# Patient Record
Sex: MALE | Race: WHITE | HISPANIC OR LATINO | Employment: PART TIME | URBAN - METROPOLITAN AREA
[De-identification: names, ages, dates, MRNs, and addresses within clinical notes are randomized per-mention and may not be internally consistent; named-entity substitution may affect disease eponyms.]

---
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
## 2023-02-07 ENCOUNTER — TELEPHONE (OUTPATIENT)
Dept: HEMATOLOGY/ONCOLOGY | Facility: CLINIC | Age: 18
End: 2023-02-07
Payer: COMMERCIAL

## 2023-02-08 ENCOUNTER — HOSPITAL ENCOUNTER (OUTPATIENT)
Dept: RADIOLOGY | Facility: HOSPITAL | Age: 18
Discharge: HOME | End: 2023-02-08
Attending: INTERNAL MEDICINE
Payer: COMMERCIAL

## 2023-02-08 DIAGNOSIS — C81.18 NODULAR SCLEROSIS HODGKIN LYMPHOMA OF LYMPH NODES OF MULTIPLE REGIONS: ICD-10-CM

## 2023-02-08 PROCEDURE — 74177 CT ABD & PELVIS W/CONTRAST: CPT | Mod: TC

## 2023-02-08 PROCEDURE — 70491 CT SOFT TISSUE NECK W/DYE: CPT | Mod: TC

## 2023-02-08 PROCEDURE — 25500020 PHARM REV CODE 255: Performed by: INTERNAL MEDICINE

## 2023-02-08 PROCEDURE — 71260 CT THORAX DX C+: CPT | Mod: TC

## 2023-02-08 RX ADMIN — DIATRIZOATE MEGLUMINE AND DIATRIZOATE SODIUM 30 ML: 660; 100 LIQUID ORAL; RECTAL at 08:02

## 2023-02-08 RX ADMIN — IOPAMIDOL 120 ML: 755 INJECTION, SOLUTION INTRAVENOUS at 09:02

## 2023-02-10 PROBLEM — Z95.828 PORT-A-CATH IN PLACE: Status: RESOLVED | Noted: 2022-05-05 | Resolved: 2023-02-10

## 2023-02-13 NOTE — PROGRESS NOTES
Subjective:       Patient ID: Valeriy Nelson is a 17 y.o. male.  Pediatrician: Dr. Amena Lowery     Hodgkin Lymphoma Stage IIA--Diagnosed 21  Biopsy/pathology: Left neck excisional biopsy cervical LN done 21--Nodular sclerosis, classical Hodgkin Lymphoma, areas of clustering of malignant cells are identified suggestive of syncytial variant, CD15, PAX-5 and CD30: Positive in malignant cells, EMA: Focally positive in malignant cells, CD45: Negative in malignant cells, CD20 and CD3: Highlight the mixed B- and T lymphocyte population.  Imagin. CT soft tissue neck w/ and w/o contrast done at [facility] 21--extensive bulky left cervical lymphadenopathy concerning for lymphoma, multiple nodes throughout the carotid space, posterior cervical triangle and left supraclavicular region from level of mandibular angle to clavicle with internal foci of hypoenhancement suggesting cystic change, necrosis or suppurative lympadenitis, representative left supraclavicular lymph node measures [id number], biopsy recommended.  2. PET/CT done at [facility] 10/25/21--markedly FDG-avid left jugular chain, posterior triangle, and supraclavicular lymphadenopathy in the neck and FDG-avid superior mediastinal and left axillary zone 3 lymphadenopathy, reference lymph node anterior to SVC on right measures 2.9X1.1cm, SUV 10.4, right paratracheal node measures 2.0X1.4cm with SUV 7.8, left paratracheal node measures 1.9X0.9cm and periaortic prevascular lymph node measures 1.4X0.6cm with SUV 4.5, Deauville score 5, no masses or adenopathy in AP, no bone lesions.  3. PET/CT done at [facility] 21--significant interval response to treatment, no residual FDG uptake w/n the nodes which are significantly reduced in size in neck and chest, representative left supraclavicular LN previously measured 1.6X1.4cm now measures 1X0.4cm, previous SUV 9.8, now there is no uptake, Deauville score 1, significant areas of  uptake appearing bilaterally symmetric in neck, chest, upper abdomen w/o corresponding adenopathy, likely repeat to prominent brown fat, normal variant.  4. PET/CT done at [facility] 2/9/22--Stable exam without evidence of new or progressive hypermetabolic lymphadenopathy compared to 10/13/2021.  5. CT neck/C/A/P done 8/8/22--no enlarged LNs in neck, chest, abdomen or pelvis.   6. CT neck/C/A/P done 2/8/23--no enlarged Lns in neck, chest, abdomen or pelvis.      Right IJ mediport placed by Dr. Lopez 10/13/21--removed 2/10/23.     Treatment history:   ABVD X 4 cycles completed 10/26/21--2/4/22.     Current treatment plan: Observation     Chief Complaint: Follow-up (2m f/u, Hodgkins Lymphoma. No issues or problems.)    HPI   Patient presents for follow-up of Hodgkin Lymphoma. He is doing well. No complaints. Recent labs and scans good and I went over results. He had his mediport out on Friday. 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 (Patient not taking: Reported on 2/15/2023) 8 tablet 0     No current facility-administered medications on file prior to visit.      Review of Systems   Constitutional:  Negative for appetite change, fatigue, fever and unexpected weight change.   HENT:  Negative for mouth sores.    Eyes: Negative.  Negative for visual disturbance.   Respiratory:  Negative for cough and shortness of breath.    Cardiovascular:  Negative for chest pain and leg swelling.   Gastrointestinal:  Negative for abdominal distention, abdominal pain, constipation,  diarrhea, nausea, vomiting and reflux.   Genitourinary:  Negative for difficulty urinating, dysuria, frequency and hematuria.   Musculoskeletal:  Negative for arthralgias and back pain.   Integumentary:  Negative for rash.   Neurological:  Negative for weakness and headaches.   Hematological:  Negative for adenopathy.   Psychiatric/Behavioral:  Negative for sleep disturbance. The patient is not nervous/anxious.        Vitals:    02/15/23 0946   BP: 136/80   Pulse: 61   Temp: 98.1 °F (36.7 °C)         Physical Exam  Constitutional:       General: He is awake.      Appearance: Normal appearance.   HENT:      Head: Normocephalic and atraumatic.      Nose: Nose normal.      Mouth/Throat:      Mouth: Mucous membranes are moist.   Eyes:      General: Vision grossly intact.      Extraocular Movements: Extraocular movements intact.      Conjunctiva/sclera: Conjunctivae normal.   Neck:      Comments: Scar left neck from previous lymph node biopsy, no adenopathy  Cardiovascular:      Rate and Rhythm: Normal rate and regular rhythm.      Heart sounds: Normal heart sounds.   Pulmonary:      Effort: Pulmonary effort is normal.      Breath sounds: Normal breath sounds.   Chest:      Comments: Scar from previous mediport with steri strips in place  Abdominal:      General: Bowel sounds are normal. There is no distension.      Palpations: Abdomen is soft.      Tenderness: There is no abdominal tenderness.   Musculoskeletal:      Cervical back: Normal range of motion and neck supple.      Right lower leg: No edema.      Left lower leg: No edema.   Lymphadenopathy:      Cervical: No cervical adenopathy.      Upper Body:      Right upper body: No supraclavicular adenopathy.      Left upper body: No supraclavicular adenopathy.   Skin:     General: Skin is warm.   Neurological:      Mental Status: He is alert and oriented to person, place, and time.      Motor: Motor function is intact.   Psychiatric:         Mood and Affect: Mood  normal.         Speech: Speech normal.         Behavior: Behavior is cooperative.         Judgment: Judgment normal.       Admission on 02/10/2023, Discharged on 02/10/2023   Component Date Value    Pathology Result 02/10/2023     Lab Visit on 02/08/2023   Component Date Value    Sodium Level 02/08/2023 135 (L)     Potassium Level 02/08/2023 5.5 (H)     Chloride 02/08/2023 100     Carbon Dioxide 02/08/2023 28     Glucose Level 02/08/2023 93     Blood Urea Nitrogen 02/08/2023 15.7     Creatinine 02/08/2023 0.91     Calcium Level Total 02/08/2023 10.6 (H)     Protein Total 02/08/2023 7.2     Albumin Level 02/08/2023 4.7     Globulin 02/08/2023 2.5     Albumin/Globulin Ratio 02/08/2023 1.9     Bilirubin Total 02/08/2023 0.6     Alkaline Phosphatase 02/08/2023 103     Alanine Aminotransferase 02/08/2023 24     Aspartate Aminotransfera* 02/08/2023 16     eGFR 02/08/2023 >60     Lactate Dehydrogenase 02/08/2023 151     Sed Rate 02/08/2023 2     WBC 02/08/2023 5.4     RBC 02/08/2023 5.43     Hgb 02/08/2023 16.0     Hct 02/08/2023 47.3     MCV 02/08/2023 87.1     MCH 02/08/2023 29.5     MCHC 02/08/2023 33.8     RDW 02/08/2023 11.4 (L)     Platelet 02/08/2023 297     MPV 02/08/2023 8.8     Neut % 02/08/2023 61.6     Lymph % 02/08/2023 26.8     Mono % 02/08/2023 7.4     Eos % 02/08/2023 3.4     Basophil % 02/08/2023 0.6     Lymph # 02/08/2023 1.44     Neut # 02/08/2023 3.31     Mono # 02/08/2023 0.40     Eos # 02/08/2023 0.18     Baso # 02/08/2023 0.03     IG# 02/08/2023 0.01     IG% 02/08/2023 0.2         Assessment:       1. 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  Patient has no B-symptoms. LDH normal, ESR 19 (<50). No anemia or leukocytosis.  PET done 10/25/21 shows LN involvement left neck, axilla and mediastinum.   Patient appears to have favorable risk disease  though he does have involvement of 3 LN regions.   Treatment recommended per NCCN guidelines with ABVD. Dr. Mao recommended against radiation due to his young age.     2DECHO done 10/22/21 EF 60-65%, full PFTs normal.  Cycle 1 ABVD started on 10/26/21.   PET/CT done 12/8/21 after 2 cycles showed CR with Deauville 1 and resolution of all adenopathy.  Completed Cycle 4 on 2/4/22.  PET/CT done 2/9/22 c/w ongoing CR/RUBI.   CT soft tissue neck/C/A/P done 8/8/22 with RUBI.  CT soft tissue neck/C/A/P done 2/8/23 with RUBI.     Currently patient is doing well without any signs or symptoms to suggest disease recurrence.       Recent labs from 2/8/23 good, aside from elevated potassium and calcium, likely lab error since these have been normal prior. LDH and sed rate both WNL    Will continue routine surveillance visits.  RTC 3 months for follow-up with labs and visit.  Plan to repeat CT soft tissue neck/C/A/P in 6 months.       Plan for continued surveillance visits every 3 months with labs and LDH, ESR as well as CT neck/C/A/P w/ contrast every 6 months X 2 years.  All questions answered at this time.         Susy Mao MD

## 2023-02-15 ENCOUNTER — TELEPHONE (OUTPATIENT)
Dept: HEMATOLOGY/ONCOLOGY | Facility: CLINIC | Age: 18
End: 2023-02-15

## 2023-02-15 ENCOUNTER — OFFICE VISIT (OUTPATIENT)
Dept: HEMATOLOGY/ONCOLOGY | Facility: CLINIC | Age: 18
End: 2023-02-15
Payer: COMMERCIAL

## 2023-02-15 VITALS
SYSTOLIC BLOOD PRESSURE: 136 MMHG | DIASTOLIC BLOOD PRESSURE: 80 MMHG | HEIGHT: 74 IN | WEIGHT: 202 LBS | HEART RATE: 61 BPM | BODY MASS INDEX: 25.93 KG/M2 | TEMPERATURE: 98 F

## 2023-02-15 DIAGNOSIS — C81.18 NODULAR SCLEROSIS HODGKIN LYMPHOMA OF LYMPH NODES OF MULTIPLE REGIONS: Primary | ICD-10-CM

## 2023-02-15 PROCEDURE — 1160F PR REVIEW ALL MEDS BY PRESCRIBER/CLIN PHARMACIST DOCUMENTED: ICD-10-PCS | Mod: CPTII,S$GLB,, | Performed by: INTERNAL MEDICINE

## 2023-02-15 PROCEDURE — 1159F PR MEDICATION LIST DOCUMENTED IN MEDICAL RECORD: ICD-10-PCS | Mod: CPTII,S$GLB,, | Performed by: INTERNAL MEDICINE

## 2023-02-15 PROCEDURE — 3079F DIAST BP 80-89 MM HG: CPT | Mod: CPTII,S$GLB,, | Performed by: INTERNAL MEDICINE

## 2023-02-15 PROCEDURE — 3075F PR MOST RECENT SYSTOLIC BLOOD PRESS GE 130-139MM HG: ICD-10-PCS | Mod: CPTII,S$GLB,, | Performed by: INTERNAL MEDICINE

## 2023-02-15 PROCEDURE — 3075F SYST BP GE 130 - 139MM HG: CPT | Mod: CPTII,S$GLB,, | Performed by: INTERNAL MEDICINE

## 2023-02-15 PROCEDURE — 99999 PR PBB SHADOW E&M-EST. PATIENT-LVL III: CPT | Mod: PBBFAC,,, | Performed by: INTERNAL MEDICINE

## 2023-02-15 PROCEDURE — 3079F PR MOST RECENT DIASTOLIC BLOOD PRESSURE 80-89 MM HG: ICD-10-PCS | Mod: CPTII,S$GLB,, | Performed by: INTERNAL MEDICINE

## 2023-02-15 PROCEDURE — 99999 PR PBB SHADOW E&M-EST. PATIENT-LVL III: ICD-10-PCS | Mod: PBBFAC,,, | Performed by: INTERNAL MEDICINE

## 2023-02-15 PROCEDURE — 3008F BODY MASS INDEX DOCD: CPT | Mod: CPTII,S$GLB,, | Performed by: INTERNAL MEDICINE

## 2023-02-15 PROCEDURE — 99214 OFFICE O/P EST MOD 30 MIN: CPT | Mod: S$GLB,,, | Performed by: INTERNAL MEDICINE

## 2023-02-15 PROCEDURE — 99214 PR OFFICE/OUTPT VISIT, EST, LEVL IV, 30-39 MIN: ICD-10-PCS | Mod: S$GLB,,, | Performed by: INTERNAL MEDICINE

## 2023-02-15 PROCEDURE — 3008F PR BODY MASS INDEX (BMI) DOCUMENTED: ICD-10-PCS | Mod: CPTII,S$GLB,, | Performed by: INTERNAL MEDICINE

## 2023-02-15 PROCEDURE — 1159F MED LIST DOCD IN RCRD: CPT | Mod: CPTII,S$GLB,, | Performed by: INTERNAL MEDICINE

## 2023-02-15 PROCEDURE — 1160F RVW MEDS BY RX/DR IN RCRD: CPT | Mod: CPTII,S$GLB,, | Performed by: INTERNAL MEDICINE

## 2023-04-24 NOTE — PROGRESS NOTES
Subjective:       Patient ID: Valeriy Nelson is a 17 y.o. male.  Pediatrician: Dr. Amena Lowery     Hodgkin Lymphoma Stage IIA--Diagnosed 21  Biopsy/pathology: Left neck excisional biopsy cervical LN done 21--Nodular sclerosis, classical Hodgkin Lymphoma, areas of clustering of malignant cells are identified suggestive of syncytial variant, CD15, PAX-5 and CD30: Positive in malignant cells, EMA: Focally positive in malignant cells, CD45: Negative in malignant cells, CD20 and CD3: Highlight the mixed B- and T lymphocyte population.  Imagin. CT soft tissue neck w/ and w/o contrast done at [facility] 21--extensive bulky left cervical lymphadenopathy concerning for lymphoma, multiple nodes throughout the carotid space, posterior cervical triangle and left supraclavicular region from level of mandibular angle to clavicle with internal foci of hypoenhancement suggesting cystic change, necrosis or suppurative lympadenitis, representative left supraclavicular lymph node measures [id number], biopsy recommended.  2. PET/CT done at [facility] 10/25/21--markedly FDG-avid left jugular chain, posterior triangle, and supraclavicular lymphadenopathy in the neck and FDG-avid superior mediastinal and left axillary zone 3 lymphadenopathy, reference lymph node anterior to SVC on right measures 2.9X1.1cm, SUV 10.4, right paratracheal node measures 2.0X1.4cm with SUV 7.8, left paratracheal node measures 1.9X0.9cm and periaortic prevascular lymph node measures 1.4X0.6cm with SUV 4.5, Deauville score 5, no masses or adenopathy in AP, no bone lesions.  3. PET/CT done at [facility] 21--significant interval response to treatment, no residual FDG uptake w/n the nodes which are significantly reduced in size in neck and chest, representative left supraclavicular LN previously measured 1.6X1.4cm now measures 1X0.4cm, previous SUV 9.8, now there is no uptake, Deauville score 1, significant areas of  uptake appearing bilaterally symmetric in neck, chest, upper abdomen w/o corresponding adenopathy, likely repeat to prominent brown fat, normal variant.  4. PET/CT done at [facility] 2/9/22--Stable exam without evidence of new or progressive hypermetabolic lymphadenopathy compared to 10/13/2021.  5. CT neck/C/A/P done 8/8/22--no enlarged LNs in neck, chest, abdomen or pelvis.   6. CT neck/C/A/P done 2/8/23--no enlarged Lns in neck, chest, abdomen or pelvis.      Right IJ mediport placed by Dr. Lopez 10/13/21--removed 2/10/23.     Treatment history:   ABVD X 4 cycles completed 10/26/21--2/4/22.     Current treatment plan: Observation     Chief Complaint: Other Misc (Pt reports no new concerns today.)    HPI   Patient presents for telemedicine follow-up of Hodgkin Lymphoma. He is doing well. No complaints. Recent labs good and I went over results.  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 Denies any B symptoms. He plans to return as a  for the summer camp in [place]. No new problems reported.     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 (Patient not taking: Reported on 2/15/2023) 8 tablet 0     No current facility-administered medications on file prior to visit.      Review of Systems   Constitutional:  Negative for appetite change, fatigue, fever and unexpected weight change.   HENT:  Negative for mouth sores.    Eyes: Negative.  Negative for visual disturbance.   Respiratory:  Negative for cough and shortness of breath.    Cardiovascular:  Negative for chest pain and leg  swelling.   Gastrointestinal:  Negative for abdominal distention, abdominal pain, constipation, diarrhea, nausea, vomiting and reflux.   Genitourinary:  Negative for difficulty urinating, dysuria, frequency and hematuria.   Musculoskeletal:  Negative for arthralgias and back pain.   Integumentary:  Negative for rash.   Neurological:  Negative for weakness and headaches.   Hematological:  Negative for adenopathy.   Psychiatric/Behavioral:  Negative for sleep disturbance. The patient is not nervous/anxious.        There were no vitals filed for this visit.    Physical Exam  Vitals reviewed.   Constitutional:       Appearance: Normal appearance.   HENT:      Head: Normocephalic.   Eyes:      Extraocular Movements: Extraocular movements intact.   Pulmonary:      Effort: Pulmonary effort is normal.   Musculoskeletal:      Cervical back: Neck supple.   Neurological:      Mental Status: He is alert and oriented to person, place, and time.   Psychiatric:         Mood and Affect: Mood normal.         Thought Content: Thought content normal.         Judgment: Judgment normal.       Lab Visit on 04/26/2023   Component Date Value    Sodium Level 04/26/2023 140     Potassium Level 04/26/2023 4.9     Chloride 04/26/2023 104     Carbon Dioxide 04/26/2023 28     Glucose Level 04/26/2023 88     Blood Urea Nitrogen 04/26/2023 18.3     Creatinine 04/26/2023 1.00     Calcium Level Total 04/26/2023 10.0     Protein Total 04/26/2023 7.3     Albumin Level 04/26/2023 4.8     Globulin 04/26/2023 2.5     Albumin/Globulin Ratio 04/26/2023 1.9     Bilirubin Total 04/26/2023 0.4     Alkaline Phosphatase 04/26/2023 103     Alanine Aminotransferase 04/26/2023 13     Aspartate Aminotransfera* 04/26/2023 16     eGFR 04/26/2023 >60     Lactate Dehydrogenase 04/26/2023 168     Sed Rate 04/26/2023 1     WBC 04/26/2023 7.4     RBC 04/26/2023 5.06     Hgb 04/26/2023 15.0     Hct 04/26/2023 45.1     MCV 04/26/2023 89.1     MCH 04/26/2023 29.6     MCHC  04/26/2023 33.3     RDW 04/26/2023 11.7     Platelet 04/26/2023 294     MPV 04/26/2023 9.0     Neut % 04/26/2023 57.5     Lymph % 04/26/2023 27.8     Mono % 04/26/2023 6.8     Eos % 04/26/2023 7.3     Basophil % 04/26/2023 0.5     Lymph # 04/26/2023 2.05     Neut # 04/26/2023 4.24     Mono # 04/26/2023 0.50     Eos # 04/26/2023 0.54     Baso # 04/26/2023 0.04     IG# 04/26/2023 0.01     IG% 04/26/2023 0.1         Assessment:       1. 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  Patient has no B-symptoms. LDH normal, ESR 19 (<50). No anemia or leukocytosis.  PET done 10/25/21 shows LN involvement left neck, axilla and mediastinum.   Patient appears to have favorable risk disease though he does have involvement of 3 LN regions.   Treatment recommended per NCCN guidelines with ABVD. Dr. Mao recommended against radiation due to his young age.     2DECHO done 10/22/21 EF 60-65%, full PFTs normal.  Cycle 1 ABVD started on 10/26/21.   PET/CT done 12/8/21 after 2 cycles showed CR with Deauville 1 and resolution of all adenopathy.  Completed Cycle 4 on 2/4/22.  PET/CT done 2/9/22 c/w ongoing CR/RUBI.   CT soft tissue neck/C/A/P done 8/8/22 with RUBI.  CT soft tissue neck/C/A/P done 2/8/23 with RUBI.     Currently patient is doing well without any signs or symptoms to suggest disease recurrence.   Recent labs, LDH and sed rate both WNL  Will continue routine surveillance visits.  Plan to repeat CT soft tissue neck/C/A/P in 3 months.    Follow-up in 3 months after scans completed.      Plan for continued surveillance visits every 3 months with labs and LDH, ESR as well as CT neck/C/A/P w/ contrast every 6 months X 2 years.  All questions answered at this time.      This is a telemedicine note. Patient was treated using telemedicine, realtime audio and video, according to  [facility] protocol. I, distant provider, conducted the visit from [facility]. The patient participated in the visit at a non-[facility] location selected by the patient, identified at his home. I am licensed in the state where the patient stated he was located. The patient stated that he understood and accepted the privacy and security risks to their information at their location.        Caitlin Modi, [facility]

## 2023-04-26 ENCOUNTER — LAB VISIT (OUTPATIENT)
Dept: LAB | Facility: HOSPITAL | Age: 18
End: 2023-04-26
Attending: NURSE PRACTITIONER
Payer: COMMERCIAL

## 2023-04-26 DIAGNOSIS — C81.18 NODULAR SCLEROSIS HODGKIN LYMPHOMA OF LYMPH NODES OF MULTIPLE REGIONS: ICD-10-CM

## 2023-04-26 LAB
ALBUMIN SERPL-MCNC: 4.8 G/DL (ref 3.5–5)
ALBUMIN/GLOB SERPL: 1.9 RATIO (ref 1.1–2)
ALP SERPL-CCNC: 103 UNIT/L
ALT SERPL-CCNC: 13 UNIT/L (ref 0–55)
AST SERPL-CCNC: 16 UNIT/L (ref 5–34)
BILIRUBIN DIRECT+TOT PNL SERPL-MCNC: 0.4 MG/DL
BUN SERPL-MCNC: 18.3 MG/DL (ref 8.9–20.6)
CALCIUM SERPL-MCNC: 10 MG/DL (ref 8.4–10.2)
CHLORIDE SERPL-SCNC: 104 MMOL/L (ref 98–107)
CO2 SERPL-SCNC: 28 MMOL/L (ref 22–29)
CREAT SERPL-MCNC: 1 MG/DL (ref 0.73–1.18)
ERYTHROCYTE [SEDIMENTATION RATE] IN BLOOD: 1 MM/HR (ref 0–15)
GFR SERPLBLD CREATININE-BSD FMLA CKD-EPI: >60 MLS/MIN/1.73/M2
GLOBULIN SER-MCNC: 2.5 GM/DL (ref 2.4–3.5)
GLUCOSE SERPL-MCNC: 88 MG/DL (ref 74–100)
LDH SERPL-CCNC: 168 U/L (ref 125–220)
POTASSIUM SERPL-SCNC: 4.9 MMOL/L (ref 3.5–5.1)
PROT SERPL-MCNC: 7.3 GM/DL (ref 6.4–8.3)
SODIUM SERPL-SCNC: 140 MMOL/L (ref 136–145)

## 2023-04-26 PROCEDURE — 85651 RBC SED RATE NONAUTOMATED: CPT

## 2023-04-26 PROCEDURE — 85025 COMPLETE CBC W/AUTO DIFF WBC: CPT

## 2023-04-26 PROCEDURE — 83615 LACTATE (LD) (LDH) ENZYME: CPT

## 2023-04-26 PROCEDURE — 36415 COLL VENOUS BLD VENIPUNCTURE: CPT

## 2023-04-26 PROCEDURE — 80053 COMPREHEN METABOLIC PANEL: CPT

## 2023-04-27 LAB
BASOPHILS # BLD AUTO: 0.04 X10(3)/MCL (ref 0–0.2)
BASOPHILS NFR BLD AUTO: 0.5 %
EOSINOPHIL # BLD AUTO: 0.54 X10(3)/MCL (ref 0–0.9)
EOSINOPHIL NFR BLD AUTO: 7.3 %
ERYTHROCYTE [DISTWIDTH] IN BLOOD BY AUTOMATED COUNT: 11.7 % (ref 11.5–17)
HCT VFR BLD AUTO: 45.1 % (ref 42–52)
HGB BLD-MCNC: 15 G/DL (ref 14–18)
IMM GRANULOCYTES # BLD AUTO: 0.01 X10(3)/MCL (ref 0–0.04)
IMM GRANULOCYTES NFR BLD AUTO: 0.1 %
LYMPHOCYTES # BLD AUTO: 2.05 X10(3)/MCL (ref 0.6–4.6)
LYMPHOCYTES NFR BLD AUTO: 27.8 %
MCH RBC QN AUTO: 29.6 PG (ref 27–31)
MCHC RBC AUTO-ENTMCNC: 33.3 G/DL (ref 33–36)
MCV RBC AUTO: 89.1 FL (ref 80–94)
MONOCYTES # BLD AUTO: 0.5 X10(3)/MCL (ref 0.1–1.3)
MONOCYTES NFR BLD AUTO: 6.8 %
NEUTROPHILS # BLD AUTO: 4.24 X10(3)/MCL (ref 2.1–9.2)
NEUTROPHILS NFR BLD AUTO: 57.5 %
PLATELET # BLD AUTO: 294 X10(3)/MCL (ref 130–400)
PMV BLD AUTO: 9 FL (ref 7.4–10.4)
RBC # BLD AUTO: 5.06 X10(6)/MCL (ref 4.7–6.1)
WBC # SPEC AUTO: 7.4 X10(3)/MCL (ref 4.5–11.5)

## 2023-05-01 ENCOUNTER — OFFICE VISIT (OUTPATIENT)
Dept: HEMATOLOGY/ONCOLOGY | Facility: CLINIC | Age: 18
End: 2023-05-01
Payer: COMMERCIAL

## 2023-05-01 VITALS — BODY MASS INDEX: 26.18 KG/M2 | WEIGHT: 204 LBS | HEIGHT: 74 IN

## 2023-05-01 DIAGNOSIS — C81.18 NODULAR SCLEROSIS HODGKIN LYMPHOMA OF LYMPH NODES OF MULTIPLE REGIONS: Primary | ICD-10-CM

## 2023-05-01 DIAGNOSIS — R59.0 LOCALIZED ENLARGED LYMPH NODES: ICD-10-CM

## 2023-05-01 PROCEDURE — 1159F MED LIST DOCD IN RCRD: CPT | Mod: CPTII,95,, | Performed by: NURSE PRACTITIONER

## 2023-05-01 PROCEDURE — 3008F PR BODY MASS INDEX (BMI) DOCUMENTED: ICD-10-PCS | Mod: CPTII,95,, | Performed by: NURSE PRACTITIONER

## 2023-05-01 PROCEDURE — 1160F RVW MEDS BY RX/DR IN RCRD: CPT | Mod: CPTII,95,, | Performed by: NURSE PRACTITIONER

## 2023-05-01 PROCEDURE — 99212 OFFICE O/P EST SF 10 MIN: CPT | Mod: 95,,, | Performed by: NURSE PRACTITIONER

## 2023-05-01 PROCEDURE — 1160F PR REVIEW ALL MEDS BY PRESCRIBER/CLIN PHARMACIST DOCUMENTED: ICD-10-PCS | Mod: CPTII,95,, | Performed by: NURSE PRACTITIONER

## 2023-05-01 PROCEDURE — 3008F BODY MASS INDEX DOCD: CPT | Mod: CPTII,95,, | Performed by: NURSE PRACTITIONER

## 2023-05-01 PROCEDURE — 1159F PR MEDICATION LIST DOCUMENTED IN MEDICAL RECORD: ICD-10-PCS | Mod: CPTII,95,, | Performed by: NURSE PRACTITIONER

## 2023-05-01 PROCEDURE — 99212 PR OFFICE/OUTPT VISIT, EST, LEVL II, 10-19 MIN: ICD-10-PCS | Mod: 95,,, | Performed by: NURSE PRACTITIONER

## 2023-08-11 ENCOUNTER — HOSPITAL ENCOUNTER (OUTPATIENT)
Dept: RADIOLOGY | Facility: HOSPITAL | Age: 18
Discharge: HOME | End: 2023-08-11
Attending: NURSE PRACTITIONER
Payer: COMMERCIAL

## 2023-08-11 DIAGNOSIS — C81.18 NODULAR SCLEROSIS HODGKIN LYMPHOMA OF LYMPH NODES OF MULTIPLE REGIONS: ICD-10-CM

## 2023-08-11 DIAGNOSIS — R59.0 LOCALIZED ENLARGED LYMPH NODES: ICD-10-CM

## 2023-08-11 PROCEDURE — 74177 CT ABD & PELVIS W/CONTRAST: CPT | Mod: TC

## 2023-08-11 PROCEDURE — 25500020 PHARM REV CODE 255: Performed by: NURSE PRACTITIONER

## 2023-08-11 PROCEDURE — 70492 CT SFT TSUE NCK W/O & W/DYE: CPT | Mod: TC

## 2023-08-11 PROCEDURE — 71260 CT THORAX DX C+: CPT | Mod: TC

## 2023-08-11 RX ADMIN — DIATRIZOATE MEGLUMINE AND DIATRIZOATE SODIUM 30 ML: 660; 100 LIQUID ORAL; RECTAL at 12:08

## 2023-08-11 RX ADMIN — IOPAMIDOL 125 ML: 755 INJECTION, SOLUTION INTRAVENOUS at 01:08

## 2023-08-14 NOTE — PROGRESS NOTES
Subjective:       Patient ID: Valeriy Nelson is a 18 y.o. male.  Pediatrician: Dr. Amena Lowery     Hodgkin Lymphoma Stage IIA--Diagnosed 21  Biopsy/pathology: Left neck excisional biopsy cervical LN done 21--Nodular sclerosis, classical Hodgkin Lymphoma, areas of clustering of malignant cells are identified suggestive of syncytial variant, CD15, PAX-5 and CD30: Positive in malignant cells, EMA: Focally positive in malignant cells, CD45: Negative in malignant cells, CD20 and CD3: Highlight the mixed B- and T lymphocyte population.  Imagin. CT soft tissue neck w/ and w/o contrast done at [facility] 21--extensive bulky left cervical lymphadenopathy concerning for lymphoma, multiple nodes throughout the carotid space, posterior cervical triangle and left supraclavicular region from level of mandibular angle to clavicle with internal foci of hypoenhancement suggesting cystic change, necrosis or suppurative lympadenitis, representative left supraclavicular lymph node measures [id number], biopsy recommended.  2. PET/CT done at [facility] 10/25/21--markedly FDG-avid left jugular chain, posterior triangle, and supraclavicular lymphadenopathy in the neck and FDG-avid superior mediastinal and left axillary zone 3 lymphadenopathy, reference lymph node anterior to SVC on right measures 2.9X1.1cm, SUV 10.4, right paratracheal node measures 2.0X1.4cm with SUV 7.8, left paratracheal node measures 1.9X0.9cm and periaortic prevascular lymph node measures 1.4X0.6cm with SUV 4.5, Deauville score 5, no masses or adenopathy in AP, no bone lesions.  3. PET/CT done at [facility] 21--significant interval response to treatment, no residual FDG uptake w/n the nodes which are significantly reduced in size in neck and chest, representative left supraclavicular LN previously measured 1.6X1.4cm now measures 1X0.4cm, previous SUV 9.8, now there is no uptake, Deauville score 1, significant areas of  uptake appearing bilaterally symmetric in neck, chest, upper abdomen w/o corresponding adenopathy, likely repeat to prominent brown fat, normal variant.  4. PET/CT done at [facility] 2/9/22--Stable exam without evidence of new or progressive hypermetabolic lymphadenopathy compared to 10/13/2021.  5. CT neck/C/A/P done 8/8/22--no enlarged LNs in neck, chest, abdomen or pelvis.   6. CT neck/C/A/P done 2/8/23--no enlarged Lns in neck, chest, abdomen or pelvis.   7. CT neck/C/A/P done 8/11/23--no enlarged Lns in neck, chest, abdomen or pelvis.     Right IJ mediport placed by Dr. Lopez 10/13/21--removed 2/10/23.     Treatment history:   ABVD X 4 cycles completed 10/26/21--2/4/22.     Current treatment plan: Observation     Chief Complaint: Other Misc (Pt reports no new concerns today.)    HPI   Patient presents for follow-up of Hodgkin Lymphoma. He is doing well. About to start back school next week. Recent labs all good and CT scans good. He has no B-symptoms.     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     No current facility-administered medications on file prior to visit.      Review of Systems   Constitutional:  Negative for appetite change, fatigue, fever and unexpected weight change.   HENT:  Negative for mouth sores.    Eyes: Negative.  Negative for visual disturbance.   Respiratory:  Negative for cough and shortness of breath.    Cardiovascular:  Negative for chest pain and leg swelling.   Gastrointestinal:  Negative for abdominal distention, abdominal pain, constipation, diarrhea, nausea, vomiting and reflux.   Genitourinary:  Negative for difficulty urinating, dysuria, frequency and hematuria.   Musculoskeletal:  Negative for arthralgias and back pain.    Integumentary:  Negative for rash.   Neurological:  Negative for weakness and headaches.   Hematological:  Negative for adenopathy.   Psychiatric/Behavioral:  Negative for sleep disturbance. The patient is not nervous/anxious.          Vitals:    08/16/23 1017   BP: (!) 143/85   Pulse: 79   Resp: 14   Temp: 98 °F (36.7 °C)       Physical Exam  Vitals reviewed.   Constitutional:       Appearance: Normal appearance.   HENT:      Head: Normocephalic.   Eyes:      Extraocular Movements: Extraocular movements intact.   Pulmonary:      Effort: Pulmonary effort is normal.   Musculoskeletal:      Cervical back: Neck supple.   Neurological:      Mental Status: He is alert and oriented to person, place, and time.   Psychiatric:         Mood and Affect: Mood normal.         Thought Content: Thought content normal.         Judgment: Judgment normal.         Lab Visit on 08/11/2023   Component Date Value    Lactate Dehydrogenase 08/11/2023 109 (L)     Sodium Level 08/11/2023 136     Potassium Level 08/11/2023 4.9     Chloride 08/11/2023 101     Carbon Dioxide 08/11/2023 28     Glucose Level 08/11/2023 90     Blood Urea Nitrogen 08/11/2023 13.2     Creatinine 08/11/2023 0.83     Calcium Level Total 08/11/2023 9.9     Protein Total 08/11/2023 6.7     Albumin Level 08/11/2023 4.5     Globulin 08/11/2023 2.2 (L)     Albumin/Globulin Ratio 08/11/2023 2.0     Bilirubin Total 08/11/2023 0.8     Alkaline Phosphatase 08/11/2023 89     Alanine Aminotransferase 08/11/2023 13     Aspartate Aminotransfera* 08/11/2023 14     eGFR 08/11/2023 >60     Sed Rate 08/11/2023 <1     WBC 08/11/2023 7.01     RBC 08/11/2023 5.12     Hgb 08/11/2023 15.1     Hct 08/11/2023 45.9     MCV 08/11/2023 89.6     MCH 08/11/2023 29.5     MCHC 08/11/2023 32.9 (L)     RDW 08/11/2023 11.6     Platelet 08/11/2023 283     MPV 08/11/2023 9.0     Neut % 08/11/2023 66.4     Lymph % 08/11/2023 24.5     Mono % 08/11/2023 6.1     Eos % 08/11/2023 2.3     Basophil %  08/11/2023 0.6     Lymph # 08/11/2023 1.72     Neut # 08/11/2023 4.65     Mono # 08/11/2023 0.43     Eos # 08/11/2023 0.16     Baso # 08/11/2023 0.04     IG# 08/11/2023 0.01     IG% 08/11/2023 0.1         Assessment:       1. 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  Patient has no B-symptoms. LDH normal, ESR 19 (<50). No anemia or leukocytosis.  PET done 10/25/21 shows LN involvement left neck, axilla and mediastinum.   Patient appears to have favorable risk disease though he does have involvement of 3 LN regions.   Treatment recommended per NCCN guidelines with ABVD. Dr. Mao recommended against radiation due to his young age.     2DECHO done 10/22/21 EF 60-65%, full PFTs normal.  Cycle 1 ABVD started on 10/26/21.   PET/CT done 12/8/21 after 2 cycles showed CR with Deauville 1 and resolution of all adenopathy.  Completed Cycle 4 on 2/4/22.  PET/CT done 2/9/22 c/w ongoing CR/RUBI.   CT soft tissue neck/C/A/P done 8/8/22 with RUBI.  CT soft tissue neck/C/A/P done 2/8/23 with RUBI.   CT soft tissue neck/C/A/P done 8/11/23 with RUBI.     Currently patient is doing well without any signs or symptoms to suggest disease recurrence.     Recent labs, LDH and sed rate both WNL  Will continue routine surveillance visits.  RTC 3 months for follow-up with repeat labs.   Plan to repeat CT soft tissue neck/C/A/P in 6 months and this will be final scan. Visits with labs will continue thereafter every 6 months.     Plan for continued surveillance visits every 3 months with labs and LDH, ESR as well as CT neck/C/A/P w/ contrast every 6 months X 2 years.  All questions answered at this time.      Susy Mao MD

## 2023-08-16 ENCOUNTER — OFFICE VISIT (OUTPATIENT)
Dept: HEMATOLOGY/ONCOLOGY | Facility: CLINIC | Age: 18
End: 2023-08-16
Payer: COMMERCIAL

## 2023-08-16 VITALS
DIASTOLIC BLOOD PRESSURE: 85 MMHG | WEIGHT: 201.19 LBS | HEIGHT: 74 IN | HEART RATE: 79 BPM | RESPIRATION RATE: 14 BRPM | SYSTOLIC BLOOD PRESSURE: 143 MMHG | OXYGEN SATURATION: 97 % | BODY MASS INDEX: 25.82 KG/M2 | TEMPERATURE: 98 F

## 2023-08-16 DIAGNOSIS — C81.18 NODULAR SCLEROSIS HODGKIN LYMPHOMA OF LYMPH NODES OF MULTIPLE REGIONS: Primary | ICD-10-CM

## 2023-08-16 PROCEDURE — 3008F BODY MASS INDEX DOCD: CPT | Mod: CPTII,S$GLB,, | Performed by: INTERNAL MEDICINE

## 2023-08-16 PROCEDURE — 99214 OFFICE O/P EST MOD 30 MIN: CPT | Mod: S$GLB,,, | Performed by: INTERNAL MEDICINE

## 2023-08-16 PROCEDURE — 99999 PR PBB SHADOW E&M-EST. PATIENT-LVL III: ICD-10-PCS | Mod: PBBFAC,,, | Performed by: INTERNAL MEDICINE

## 2023-08-16 PROCEDURE — 3077F PR MOST RECENT SYSTOLIC BLOOD PRESSURE >= 140 MM HG: ICD-10-PCS | Mod: CPTII,S$GLB,, | Performed by: INTERNAL MEDICINE

## 2023-08-16 PROCEDURE — 1160F PR REVIEW ALL MEDS BY PRESCRIBER/CLIN PHARMACIST DOCUMENTED: ICD-10-PCS | Mod: CPTII,S$GLB,, | Performed by: INTERNAL MEDICINE

## 2023-08-16 PROCEDURE — 99999 PR PBB SHADOW E&M-EST. PATIENT-LVL III: CPT | Mod: PBBFAC,,, | Performed by: INTERNAL MEDICINE

## 2023-08-16 PROCEDURE — 1160F RVW MEDS BY RX/DR IN RCRD: CPT | Mod: CPTII,S$GLB,, | Performed by: INTERNAL MEDICINE

## 2023-08-16 PROCEDURE — 3079F DIAST BP 80-89 MM HG: CPT | Mod: CPTII,S$GLB,, | Performed by: INTERNAL MEDICINE

## 2023-08-16 PROCEDURE — 1159F PR MEDICATION LIST DOCUMENTED IN MEDICAL RECORD: ICD-10-PCS | Mod: CPTII,S$GLB,, | Performed by: INTERNAL MEDICINE

## 2023-08-16 PROCEDURE — 3079F PR MOST RECENT DIASTOLIC BLOOD PRESSURE 80-89 MM HG: ICD-10-PCS | Mod: CPTII,S$GLB,, | Performed by: INTERNAL MEDICINE

## 2023-08-16 PROCEDURE — 99214 PR OFFICE/OUTPT VISIT, EST, LEVL IV, 30-39 MIN: ICD-10-PCS | Mod: S$GLB,,, | Performed by: INTERNAL MEDICINE

## 2023-08-16 PROCEDURE — 3077F SYST BP >= 140 MM HG: CPT | Mod: CPTII,S$GLB,, | Performed by: INTERNAL MEDICINE

## 2023-08-16 PROCEDURE — 3008F PR BODY MASS INDEX (BMI) DOCUMENTED: ICD-10-PCS | Mod: CPTII,S$GLB,, | Performed by: INTERNAL MEDICINE

## 2023-08-16 PROCEDURE — 1159F MED LIST DOCD IN RCRD: CPT | Mod: CPTII,S$GLB,, | Performed by: INTERNAL MEDICINE

## 2023-11-08 NOTE — PROGRESS NOTES
Subjective:       Patient ID: Valeriy Nelson is a 18 y.o. male.  Pediatrician: Dr. Amena Lowery     Hodgkin Lymphoma Stage IIA--Diagnosed 21  Biopsy/pathology: Left neck excisional biopsy cervical LN done 21--Nodular sclerosis, classical Hodgkin Lymphoma, areas of clustering of malignant cells are identified suggestive of syncytial variant, CD15, PAX-5 and CD30: Positive in malignant cells, EMA: Focally positive in malignant cells, CD45: Negative in malignant cells, CD20 and CD3: Highlight the mixed B- and T lymphocyte population.  Imagin. CT soft tissue neck w/ and w/o contrast done at [facility] 21--extensive bulky left cervical lymphadenopathy concerning for lymphoma, multiple nodes throughout the carotid space, posterior cervical triangle and left supraclavicular region from level of mandibular angle to clavicle with internal foci of hypoenhancement suggesting cystic change, necrosis or suppurative lympadenitis, representative left supraclavicular lymph node measures [id number], biopsy recommended.  2. PET/CT done at [facility] 10/25/21--markedly FDG-avid left jugular chain, posterior triangle, and supraclavicular lymphadenopathy in the neck and FDG-avid superior mediastinal and left axillary zone 3 lymphadenopathy, reference lymph node anterior to SVC on right measures 2.9X1.1cm, SUV 10.4, right paratracheal node measures 2.0X1.4cm with SUV 7.8, left paratracheal node measures 1.9X0.9cm and periaortic prevascular lymph node measures 1.4X0.6cm with SUV 4.5, Deauville score 5, no masses or adenopathy in AP, no bone lesions.  3. PET/CT done at [facility] 21--significant interval response to treatment, no residual FDG uptake w/n the nodes which are significantly reduced in size in neck and chest, representative left supraclavicular LN previously measured 1.6X1.4cm now measures 1X0.4cm, previous SUV 9.8, now there is no uptake, Deauville score 1, significant areas of  uptake appearing bilaterally symmetric in neck, chest, upper abdomen w/o corresponding adenopathy, likely repeat to prominent brown fat, normal variant.  4. PET/CT done at [facility] 2/9/22--Stable exam without evidence of new or progressive hypermetabolic lymphadenopathy compared to 10/13/2021.  5. CT neck/C/A/P done 8/8/22--no enlarged LNs in neck, chest, abdomen or pelvis.   6. CT neck/C/A/P done 2/8/23--no enlarged Lns in neck, chest, abdomen or pelvis.   7. CT neck/C/A/P done 8/11/23--no enlarged Lns in neck, chest, abdomen or pelvis.     Right IJ mediport placed by Dr. Lopez 10/13/21--removed 2/10/23.     Treatment history:   ABVD X 4 cycles completed 10/26/21--2/4/22.     Current treatment plan: Observation     Chief Complaint: Other Misc (Pt reports no concerns today.)    HPI   Patient presents for follow-up of Hodgkin Lymphoma. He is doing well. 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 His recent labs were good. He denies any weight loss, fevers or night sweats. Appetite good and weight stable. Bowels moving well. No new problems reported today.     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 (Patient not taking: Reported on 11/15/2023) 180 mL 0     No current facility-administered medications on file prior to visit.      Review of Systems   Constitutional:  Negative for appetite change, fatigue, fever and unexpected weight change.   HENT:  Negative for mouth sores.    Eyes: Negative.  Negative for visual disturbance.   Respiratory:  Negative for cough and  shortness of breath.    Cardiovascular:  Negative for chest pain and leg swelling.   Gastrointestinal:  Negative for abdominal distention, abdominal pain, constipation, diarrhea, nausea, vomiting and reflux.   Genitourinary:  Negative for difficulty urinating, dysuria, frequency and hematuria.   Musculoskeletal:  Negative for arthralgias and back pain.   Integumentary:  Negative for rash.   Neurological:  Negative for weakness and headaches.   Hematological:  Negative for adenopathy.   Psychiatric/Behavioral:  Negative for sleep disturbance. The patient is not nervous/anxious.          Vitals:    11/15/23 1309   BP: (!) 143/85   Pulse: 68   Resp: 14   Temp: 98 °F (36.7 °C)     Physical Exam  Vitals reviewed.   Constitutional:       Appearance: Normal appearance. He is well-developed and normal weight.   HENT:      Head: Normocephalic.   Eyes:      General: Lids are normal. Vision grossly intact.      Extraocular Movements: Extraocular movements intact.   Cardiovascular:      Rate and Rhythm: Normal rate and regular rhythm.      Heart sounds: Normal heart sounds.   Pulmonary:      Effort: Pulmonary effort is normal.      Breath sounds: Normal breath sounds and air entry.   Abdominal:      General: Abdomen is flat. Bowel sounds are normal.      Palpations: Abdomen is soft.   Musculoskeletal:      Cervical back: Normal range of motion and neck supple.      Right lower leg: No edema.      Left lower leg: No edema.   Lymphadenopathy:      Comments: No palpable lymphadenopathy   Skin:     General: Skin is warm and moist.   Neurological:      General: No focal deficit present.      Mental Status: He is alert and oriented to person, place, and time.   Psychiatric:         Attention and Perception: Attention normal.         Mood and Affect: Mood normal.         Behavior: Behavior normal. Behavior is cooperative.         Thought Content: Thought content normal.         Judgment: Judgment normal.       ECOG SCORE    0 - Fully  active-able to carry on all pre-disease performance without restriction        Lab Visit on 11/13/2023   Component Date Value    Sodium Level 11/13/2023 138     Potassium Level 11/13/2023 4.5     Chloride 11/13/2023 103     Carbon Dioxide 11/13/2023 27     Glucose Level 11/13/2023 79     Blood Urea Nitrogen 11/13/2023 19.1     Creatinine 11/13/2023 0.87     Calcium Level Total 11/13/2023 9.9     Protein Total 11/13/2023 7.2     Albumin Level 11/13/2023 4.6     Globulin 11/13/2023 2.6     Albumin/Globulin Ratio 11/13/2023 1.8     Bilirubin Total 11/13/2023 0.4     Alkaline Phosphatase 11/13/2023 93     Alanine Aminotransferase 11/13/2023 20     Aspartate Aminotransfera* 11/13/2023 19     eGFR 11/13/2023 >60     Lactate Dehydrogenase 11/13/2023 176     Sed Rate 11/13/2023 4     WBC 11/13/2023 9.20     RBC 11/13/2023 5.08     Hgb 11/13/2023 15.0     Hct 11/13/2023 45.7     MCV 11/13/2023 90.0     MCH 11/13/2023 29.5     MCHC 11/13/2023 32.8 (L)     RDW 11/13/2023 11.8     Platelet 11/13/2023 345     MPV 11/13/2023 8.9     Neut % 11/13/2023 69.0     Lymph % 11/13/2023 21.2     Mono % 11/13/2023 7.0     Eos % 11/13/2023 2.2     Basophil % 11/13/2023 0.4     Lymph # 11/13/2023 1.95     Neut # 11/13/2023 6.35     Mono # 11/13/2023 0.64     Eos # 11/13/2023 0.20     Baso # 11/13/2023 0.04     IG# 11/13/2023 0.02     IG% 11/13/2023 0.2         Assessment:       1. 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 Localized enlarged lymph nodes      Plan:     Patient with Hodgkin Lymphoma diagnosed 9/24/21, Classical Nodular sclerosis type, questionable syncytial variant.  Patient has no B-symptoms. LDH normal, ESR 19 (<50). No anemia or leukocytosis.  PET done 10/25/21 shows LN involvement left neck, axilla and mediastinum.   Patient appears to have favorable risk disease though he does have involvement of 3 LN regions.   Treatment recommended per NCCN guidelines with ABVD. Dr. Mao recommended  against radiation due to his young age.     2DECHO done 10/22/21 EF 60-65%, full PFTs normal.  Cycle 1 ABVD started on 10/26/21.   PET/CT done 12/8/21 after 2 cycles showed CR with Deauville 1 and resolution of all adenopathy.  Completed Cycle 4 on 2/4/22.  PET/CT done 2/9/22 c/w ongoing CR/RUBI.   CT soft tissue neck/C/A/P done 8/8/22 with RUBI.  CT soft tissue neck/C/A/P done 2/8/23 with RUBI.   CT soft tissue neck/C/A/P done 8/11/23 with RUBI.     Currently patient is doing well without any signs or symptoms to suggest disease recurrence.   Recent labs, LDH and sed rate all WNL  Will continue routine surveillance visits.  RTC 3 months for follow-up with repeat labs.   Plan to repeat CT soft tissue neck/C/A/P in 3 months and this will be final scan. Visits with labs will continue thereafter every 6 months.  All questions answered at this time.        CHEKO Zhang

## 2023-11-13 ENCOUNTER — LAB VISIT (OUTPATIENT)
Dept: LAB | Facility: HOSPITAL | Age: 18
End: 2023-11-13
Attending: FAMILY MEDICINE
Payer: COMMERCIAL

## 2023-11-13 DIAGNOSIS — C81.18 NODULAR SCLEROSIS HODGKIN LYMPHOMA OF LYMPH NODES OF MULTIPLE REGIONS: ICD-10-CM

## 2023-11-13 LAB
ALBUMIN SERPL-MCNC: 4.6 G/DL (ref 3.5–5)
ALBUMIN/GLOB SERPL: 1.8 RATIO (ref 1.1–2)
ALP SERPL-CCNC: 93 UNIT/L (ref 40–150)
ALT SERPL-CCNC: 20 UNIT/L (ref 0–55)
AST SERPL-CCNC: 19 UNIT/L (ref 5–34)
BASOPHILS # BLD AUTO: 0.04 X10(3)/MCL
BASOPHILS NFR BLD AUTO: 0.4 %
BILIRUB SERPL-MCNC: 0.4 MG/DL
BUN SERPL-MCNC: 19.1 MG/DL (ref 8.9–20.6)
CALCIUM SERPL-MCNC: 9.9 MG/DL (ref 8.4–10.2)
CHLORIDE SERPL-SCNC: 103 MMOL/L (ref 98–107)
CO2 SERPL-SCNC: 27 MMOL/L (ref 22–29)
CREAT SERPL-MCNC: 0.87 MG/DL (ref 0.73–1.18)
EOSINOPHIL # BLD AUTO: 0.2 X10(3)/MCL (ref 0–0.9)
EOSINOPHIL NFR BLD AUTO: 2.2 %
ERYTHROCYTE [DISTWIDTH] IN BLOOD BY AUTOMATED COUNT: 11.8 % (ref 11.5–17)
ERYTHROCYTE [SEDIMENTATION RATE] IN BLOOD: 4 MM/HR (ref 0–15)
GFR SERPLBLD CREATININE-BSD FMLA CKD-EPI: >60 MLS/MIN/1.73/M2
GLOBULIN SER-MCNC: 2.6 GM/DL (ref 2.4–3.5)
GLUCOSE SERPL-MCNC: 79 MG/DL (ref 74–100)
HCT VFR BLD AUTO: 45.7 % (ref 42–52)
HGB BLD-MCNC: 15 G/DL (ref 14–18)
IMM GRANULOCYTES # BLD AUTO: 0.02 X10(3)/MCL (ref 0–0.04)
IMM GRANULOCYTES NFR BLD AUTO: 0.2 %
LDH SERPL-CCNC: 176 U/L (ref 125–220)
LYMPHOCYTES # BLD AUTO: 1.95 X10(3)/MCL (ref 0.6–4.6)
LYMPHOCYTES NFR BLD AUTO: 21.2 %
MCH RBC QN AUTO: 29.5 PG (ref 27–31)
MCHC RBC AUTO-ENTMCNC: 32.8 G/DL (ref 33–36)
MCV RBC AUTO: 90 FL (ref 80–94)
MONOCYTES # BLD AUTO: 0.64 X10(3)/MCL (ref 0.1–1.3)
MONOCYTES NFR BLD AUTO: 7 %
NEUTROPHILS # BLD AUTO: 6.35 X10(3)/MCL (ref 2.1–9.2)
NEUTROPHILS NFR BLD AUTO: 69 %
PLATELET # BLD AUTO: 345 X10(3)/MCL (ref 130–400)
PMV BLD AUTO: 8.9 FL (ref 7.4–10.4)
POTASSIUM SERPL-SCNC: 4.5 MMOL/L (ref 3.5–5.1)
PROT SERPL-MCNC: 7.2 GM/DL (ref 6.4–8.3)
RBC # BLD AUTO: 5.08 X10(6)/MCL (ref 4.7–6.1)
SODIUM SERPL-SCNC: 138 MMOL/L (ref 136–145)
WBC # SPEC AUTO: 9.2 X10(3)/MCL (ref 4.5–11.5)

## 2023-11-13 PROCEDURE — 85652 RBC SED RATE AUTOMATED: CPT

## 2023-11-13 PROCEDURE — 83615 LACTATE (LD) (LDH) ENZYME: CPT

## 2023-11-13 PROCEDURE — 80053 COMPREHEN METABOLIC PANEL: CPT

## 2023-11-13 PROCEDURE — 36415 COLL VENOUS BLD VENIPUNCTURE: CPT

## 2023-11-13 PROCEDURE — 85025 COMPLETE CBC W/AUTO DIFF WBC: CPT

## 2023-11-15 ENCOUNTER — OFFICE VISIT (OUTPATIENT)
Dept: HEMATOLOGY/ONCOLOGY | Facility: CLINIC | Age: 18
End: 2023-11-15
Payer: COMMERCIAL

## 2023-11-15 VITALS
OXYGEN SATURATION: 99 % | DIASTOLIC BLOOD PRESSURE: 85 MMHG | RESPIRATION RATE: 14 BRPM | SYSTOLIC BLOOD PRESSURE: 143 MMHG | BODY MASS INDEX: 27.53 KG/M2 | HEART RATE: 68 BPM | HEIGHT: 74 IN | TEMPERATURE: 98 F | WEIGHT: 214.5 LBS

## 2023-11-15 DIAGNOSIS — C81.18 NODULAR SCLEROSIS HODGKIN LYMPHOMA OF LYMPH NODES OF MULTIPLE REGIONS: Primary | ICD-10-CM

## 2023-11-15 DIAGNOSIS — R59.0 LOCALIZED ENLARGED LYMPH NODES: ICD-10-CM

## 2023-11-15 PROCEDURE — 1160F PR REVIEW ALL MEDS BY PRESCRIBER/CLIN PHARMACIST DOCUMENTED: ICD-10-PCS | Mod: CPTII,S$GLB,, | Performed by: NURSE PRACTITIONER

## 2023-11-15 PROCEDURE — 1159F MED LIST DOCD IN RCRD: CPT | Mod: CPTII,S$GLB,, | Performed by: NURSE PRACTITIONER

## 2023-11-15 PROCEDURE — 99999 PR PBB SHADOW E&M-EST. PATIENT-LVL IV: CPT | Mod: PBBFAC,,, | Performed by: NURSE PRACTITIONER

## 2023-11-15 PROCEDURE — 3077F PR MOST RECENT SYSTOLIC BLOOD PRESSURE >= 140 MM HG: ICD-10-PCS | Mod: CPTII,S$GLB,, | Performed by: NURSE PRACTITIONER

## 2023-11-15 PROCEDURE — 3079F PR MOST RECENT DIASTOLIC BLOOD PRESSURE 80-89 MM HG: ICD-10-PCS | Mod: CPTII,S$GLB,, | Performed by: NURSE PRACTITIONER

## 2023-11-15 PROCEDURE — 99999 PR PBB SHADOW E&M-EST. PATIENT-LVL IV: ICD-10-PCS | Mod: PBBFAC,,, | Performed by: NURSE PRACTITIONER

## 2023-11-15 PROCEDURE — 3079F DIAST BP 80-89 MM HG: CPT | Mod: CPTII,S$GLB,, | Performed by: NURSE PRACTITIONER

## 2023-11-15 PROCEDURE — 3008F BODY MASS INDEX DOCD: CPT | Mod: CPTII,S$GLB,, | Performed by: NURSE PRACTITIONER

## 2023-11-15 PROCEDURE — 1160F RVW MEDS BY RX/DR IN RCRD: CPT | Mod: CPTII,S$GLB,, | Performed by: NURSE PRACTITIONER

## 2023-11-15 PROCEDURE — 3077F SYST BP >= 140 MM HG: CPT | Mod: CPTII,S$GLB,, | Performed by: NURSE PRACTITIONER

## 2023-11-15 PROCEDURE — 1159F PR MEDICATION LIST DOCUMENTED IN MEDICAL RECORD: ICD-10-PCS | Mod: CPTII,S$GLB,, | Performed by: NURSE PRACTITIONER

## 2023-11-15 PROCEDURE — 3008F PR BODY MASS INDEX (BMI) DOCUMENTED: ICD-10-PCS | Mod: CPTII,S$GLB,, | Performed by: NURSE PRACTITIONER

## 2023-11-15 PROCEDURE — 99213 OFFICE O/P EST LOW 20 MIN: CPT | Mod: S$GLB,,, | Performed by: NURSE PRACTITIONER

## 2023-11-15 PROCEDURE — 99213 PR OFFICE/OUTPT VISIT, EST, LEVL III, 20-29 MIN: ICD-10-PCS | Mod: S$GLB,,, | Performed by: NURSE PRACTITIONER

## 2024-02-07 ENCOUNTER — HOSPITAL ENCOUNTER (OUTPATIENT)
Dept: RADIOLOGY | Facility: HOSPITAL | Age: 19
Discharge: HOME | End: 2024-02-07
Attending: NURSE PRACTITIONER
Payer: COMMERCIAL

## 2024-02-07 DIAGNOSIS — C81.18 NODULAR SCLEROSIS HODGKIN LYMPHOMA OF LYMPH NODES OF MULTIPLE REGIONS: ICD-10-CM

## 2024-02-07 DIAGNOSIS — R59.0 LOCALIZED ENLARGED LYMPH NODES: ICD-10-CM

## 2024-02-07 PROCEDURE — 25500020 PHARM REV CODE 255: Performed by: NURSE PRACTITIONER

## 2024-02-07 PROCEDURE — 70492 CT SFT TSUE NCK W/O & W/DYE: CPT | Mod: TC

## 2024-02-07 PROCEDURE — 74177 CT ABD & PELVIS W/CONTRAST: CPT | Mod: TC

## 2024-02-07 RX ADMIN — IOPAMIDOL 120 ML: 755 INJECTION, SOLUTION INTRAVENOUS at 11:02

## 2024-02-07 RX ADMIN — DIATRIZOATE MEGLUMINE AND DIATRIZOATE SODIUM 30 ML: 660; 100 LIQUID ORAL; RECTAL at 10:02

## 2024-02-15 ENCOUNTER — TELEPHONE (OUTPATIENT)
Dept: HEMATOLOGY/ONCOLOGY | Facility: CLINIC | Age: 19
End: 2024-02-15

## 2024-02-15 ENCOUNTER — OFFICE VISIT (OUTPATIENT)
Dept: HEMATOLOGY/ONCOLOGY | Facility: CLINIC | Age: 19
End: 2024-02-15
Payer: COMMERCIAL

## 2024-02-15 VITALS
OXYGEN SATURATION: 97 % | HEART RATE: 65 BPM | SYSTOLIC BLOOD PRESSURE: 127 MMHG | TEMPERATURE: 98 F | BODY MASS INDEX: 27.17 KG/M2 | RESPIRATION RATE: 14 BRPM | WEIGHT: 205 LBS | DIASTOLIC BLOOD PRESSURE: 75 MMHG | HEIGHT: 73 IN

## 2024-02-15 DIAGNOSIS — C81.18 NODULAR SCLEROSIS HODGKIN LYMPHOMA OF LYMPH NODES OF MULTIPLE REGIONS: Primary | ICD-10-CM

## 2024-02-15 PROCEDURE — 99214 OFFICE O/P EST MOD 30 MIN: CPT | Mod: S$GLB,,, | Performed by: INTERNAL MEDICINE

## 2024-02-15 PROCEDURE — 1160F RVW MEDS BY RX/DR IN RCRD: CPT | Mod: CPTII,S$GLB,, | Performed by: INTERNAL MEDICINE

## 2024-02-15 PROCEDURE — 3008F BODY MASS INDEX DOCD: CPT | Mod: CPTII,S$GLB,, | Performed by: INTERNAL MEDICINE

## 2024-02-15 PROCEDURE — 3078F DIAST BP <80 MM HG: CPT | Mod: CPTII,S$GLB,, | Performed by: INTERNAL MEDICINE

## 2024-02-15 PROCEDURE — 3074F SYST BP LT 130 MM HG: CPT | Mod: CPTII,S$GLB,, | Performed by: INTERNAL MEDICINE

## 2024-02-15 PROCEDURE — 1159F MED LIST DOCD IN RCRD: CPT | Mod: CPTII,S$GLB,, | Performed by: INTERNAL MEDICINE

## 2024-02-15 PROCEDURE — 99999 PR PBB SHADOW E&M-EST. PATIENT-LVL III: CPT | Mod: PBBFAC,,, | Performed by: INTERNAL MEDICINE

## 2024-02-15 NOTE — PROGRESS NOTES
Subjective:       Patient ID: Valeriy Nelson is a 18 y.o. male.  Pediatrician: Dr. Amena Lowery     Hodgkin Lymphoma Stage IIA--Diagnosed 21  Biopsy/pathology: Left neck excisional biopsy cervical LN done 21--Nodular sclerosis, classical Hodgkin Lymphoma, areas of clustering of malignant cells are identified suggestive of syncytial variant, CD15, PAX-5 and CD30: Positive in malignant cells, EMA: Focally positive in malignant cells, CD45: Negative in malignant cells, CD20 and CD3: Highlight the mixed B- and T lymphocyte population.  Imagin. CT soft tissue neck w/ and w/o contrast done at [facility] 21--extensive bulky left cervical lymphadenopathy concerning for lymphoma, multiple nodes throughout the carotid space, posterior cervical triangle and left supraclavicular region from level of mandibular angle to clavicle with internal foci of hypoenhancement suggesting cystic change, necrosis or suppurative lympadenitis, representative left supraclavicular lymph node measures [id number], biopsy recommended.  2. PET/CT done at [facility] 10/25/21--markedly FDG-avid left jugular chain, posterior triangle, and supraclavicular lymphadenopathy in the neck and FDG-avid superior mediastinal and left axillary zone 3 lymphadenopathy, reference lymph node anterior to SVC on right measures 2.9X1.1cm, SUV 10.4, right paratracheal node measures 2.0X1.4cm with SUV 7.8, left paratracheal node measures 1.9X0.9cm and periaortic prevascular lymph node measures 1.4X0.6cm with SUV 4.5, Deauville score 5, no masses or adenopathy in AP, no bone lesions.  3. PET/CT done at [facility] 21--significant interval response to treatment, no residual FDG uptake w/n the nodes which are significantly reduced in size in neck and chest, representative left supraclavicular LN previously measured 1.6X1.4cm now measures 1X0.4cm, previous SUV 9.8, now there is no uptake, Deauville score 1, significant areas of  uptake appearing bilaterally symmetric in neck, chest, upper abdomen w/o corresponding adenopathy, likely repeat to prominent brown fat, normal variant.  4. PET/CT done at [facility] 2/9/22--Stable exam without evidence of new or progressive hypermetabolic lymphadenopathy compared to 10/13/2021.  5. CT neck/C/A/P done 8/8/22--no enlarged LNs in neck, chest, abdomen or pelvis.   6. CT neck/C/A/P done 2/8/23--no enlarged Lns in neck, chest, abdomen or pelvis.   7. CT neck/C/A/P done 8/11/23--no enlarged Lns in neck, chest, abdomen or pelvis.  8. CT neck/C/A/P done 2/7/24--no enlarged Lns in neck, chest, abdomen or pelvis.      Right IJ mediport placed by Dr. Lopez 10/13/21--removed 2/10/23.     Treatment history:   ABVD X 4 cycles completed 10/26/21--2/4/22.     Current treatment plan: Observation     Chief Complaint: Other Misc (Pt reports no concerns today.)    HPI   Patient presents for follow-up of Hodgkin Lymphoma. He is doing well. 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 His recent labs were all good. He denies any weight loss, fevers or night sweats. CT scan also good.     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     No current facility-administered medications on file prior to visit.      Review of Systems   Constitutional:  Negative for appetite change, fatigue, fever and unexpected weight change.   HENT:  Negative for mouth sores.    Eyes: Negative.  Negative for visual disturbance.   Respiratory:  Negative for cough and shortness of breath.    Cardiovascular:  Negative for chest pain and leg swelling.   Gastrointestinal:  Negative for abdominal distention, abdominal pain, constipation, diarrhea, nausea, vomiting and reflux.    Genitourinary:  Negative for difficulty urinating, dysuria, frequency and hematuria.   Musculoskeletal:  Negative for arthralgias and back pain.   Integumentary:  Negative for rash.   Neurological:  Negative for weakness and headaches.   Hematological:  Negative for adenopathy.   Psychiatric/Behavioral:  Negative for sleep disturbance. The patient is not nervous/anxious.          Vitals:    02/15/24 1441   BP: 127/75   Pulse: 65   Resp: 14   Temp: 98 °F (36.7 °C)     Physical Exam  Vitals reviewed.   Constitutional:       Appearance: Normal appearance. He is well-developed and normal weight.   HENT:      Head: Normocephalic.   Eyes:      General: Lids are normal. Vision grossly intact.      Extraocular Movements: Extraocular movements intact.   Cardiovascular:      Rate and Rhythm: Normal rate and regular rhythm.      Heart sounds: Normal heart sounds.   Pulmonary:      Effort: Pulmonary effort is normal.      Breath sounds: Normal breath sounds and air entry.   Abdominal:      General: Abdomen is flat. Bowel sounds are normal.      Palpations: Abdomen is soft.   Musculoskeletal:      Cervical back: Normal range of motion and neck supple.      Right lower leg: No edema.      Left lower leg: No edema.   Lymphadenopathy:      Comments: No palpable lymphadenopathy   Skin:     General: Skin is warm and moist.   Neurological:      General: No focal deficit present.      Mental Status: He is alert and oriented to person, place, and time.   Psychiatric:         Attention and Perception: Attention normal.         Mood and Affect: Mood normal.         Behavior: Behavior normal. Behavior is cooperative.         Thought Content: Thought content normal.         Judgment: Judgment normal.       ECOG SCORE            Lab Visit on 02/07/2024   Component Date Value    Sodium Level 02/07/2024 135 (L)     Potassium Level 02/07/2024 5.1     Chloride 02/07/2024 102     Carbon Dioxide 02/07/2024 28     Glucose Level 02/07/2024 83      Blood Urea Nitrogen 02/07/2024 21.0 (H)     Creatinine 02/07/2024 1.06     Calcium Level Total 02/07/2024 9.7     Protein Total 02/07/2024 6.8     Albumin Level 02/07/2024 4.5     Globulin 02/07/2024 2.3 (L)     Albumin/Globulin Ratio 02/07/2024 2.0     Bilirubin Total 02/07/2024 0.6     Alkaline Phosphatase 02/07/2024 115     Alanine Aminotransferase 02/07/2024 24     Aspartate Aminotransfera* 02/07/2024 20     eGFR 02/07/2024 >60     Lactate Dehydrogenase 02/07/2024 153     Sed Rate 02/07/2024 <1     WBC 02/07/2024 5.53     RBC 02/07/2024 5.01     Hgb 02/07/2024 15.2     Hct 02/07/2024 44.2     MCV 02/07/2024 88.2     MCH 02/07/2024 30.3     MCHC 02/07/2024 34.4     RDW 02/07/2024 11.7     Platelet 02/07/2024 257     MPV 02/07/2024 8.9     Neut % 02/07/2024 72.9     Lymph % 02/07/2024 12.5     Mono % 02/07/2024 11.0     Eos % 02/07/2024 2.9     Basophil % 02/07/2024 0.5     Lymph # 02/07/2024 0.69     Neut # 02/07/2024 4.03     Mono # 02/07/2024 0.61     Eos # 02/07/2024 0.16     Baso # 02/07/2024 0.03     IG# 02/07/2024 0.01     IG% 02/07/2024 0.2         Assessment:       1. 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  Patient has no B-symptoms. LDH normal, ESR 19 (<50). No anemia or leukocytosis.  PET done 10/25/21 showed LN involvement left neck, axilla and mediastinum.   Patient appears to have favorable risk disease though he does have involvement of 3 LN regions.   Treatment recommended per NCCN guidelines with ABVD. Recommended against radiation due to his young age.     2DECHO done 10/22/21 EF 60-65%, full PFTs normal.  Cycle 1 ABVD started on 10/26/21.   PET/CT done 12/8/21 after 2 cycles showed CR with Deauville 1 and resolution of all adenopathy.  Completed Cycle 4 on 2/4/22.    CT soft tissue neck/C/A/P done 2/7/24 with RUBI.     Currently patient  is doing well without any signs or symptoms to suggest disease recurrence.     Recent labs, LDH and sed rate all WNL  Will continue routine surveillance visits.  No further scans planned unless any concerning signs/symptoms or lab abnormalities.    Plan to continue with visits every 6 months until after 5 years then annually.  Follow-up in 6 months with labs and visit.     All questions answered at this time.        Susy Mao MD

## 2024-08-08 ENCOUNTER — LAB VISIT (OUTPATIENT)
Dept: LAB | Facility: HOSPITAL | Age: 19
End: 2024-08-08
Attending: INTERNAL MEDICINE
Payer: COMMERCIAL

## 2024-08-08 DIAGNOSIS — C81.18 NODULAR SCLEROSIS HODGKIN LYMPHOMA OF LYMPH NODES OF MULTIPLE REGIONS: ICD-10-CM

## 2024-08-08 LAB
ALBUMIN SERPL-MCNC: 4.6 G/DL (ref 3.5–5)
ALBUMIN/GLOB SERPL: 1.8 RATIO (ref 1.1–2)
ALP SERPL-CCNC: 86 UNIT/L (ref 40–150)
ALT SERPL-CCNC: 19 UNIT/L (ref 0–55)
ANION GAP SERPL CALC-SCNC: 7 MEQ/L
AST SERPL-CCNC: 16 UNIT/L (ref 5–34)
BASOPHILS # BLD AUTO: 0.05 X10(3)/MCL
BASOPHILS NFR BLD AUTO: 0.7 %
BILIRUB SERPL-MCNC: 0.6 MG/DL
BUN SERPL-MCNC: 13.5 MG/DL (ref 8.9–20.6)
CALCIUM SERPL-MCNC: 9.8 MG/DL (ref 8.4–10.2)
CHLORIDE SERPL-SCNC: 105 MMOL/L (ref 98–107)
CO2 SERPL-SCNC: 27 MMOL/L (ref 22–29)
CREAT SERPL-MCNC: 1.59 MG/DL (ref 0.73–1.18)
CREAT/UREA NIT SERPL: 8
EOSINOPHIL # BLD AUTO: 0.16 X10(3)/MCL (ref 0–0.9)
EOSINOPHIL NFR BLD AUTO: 2.3 %
ERYTHROCYTE [DISTWIDTH] IN BLOOD BY AUTOMATED COUNT: 12.1 % (ref 11.5–17)
ERYTHROCYTE [SEDIMENTATION RATE] IN BLOOD: <1 MM/HR (ref 0–15)
GFR SERPLBLD CREATININE-BSD FMLA CKD-EPI: >60 ML/MIN/1.73/M2
GLOBULIN SER-MCNC: 2.5 GM/DL (ref 2.4–3.5)
GLUCOSE SERPL-MCNC: 83 MG/DL (ref 74–100)
HCT VFR BLD AUTO: 43.2 % (ref 42–52)
HGB BLD-MCNC: 14.9 G/DL (ref 14–18)
IMM GRANULOCYTES # BLD AUTO: 0.01 X10(3)/MCL (ref 0–0.04)
IMM GRANULOCYTES NFR BLD AUTO: 0.1 %
LDH SERPL-CCNC: 117 U/L (ref 125–220)
LYMPHOCYTES # BLD AUTO: 1.77 X10(3)/MCL (ref 0.6–4.6)
LYMPHOCYTES NFR BLD AUTO: 25.6 %
MCH RBC QN AUTO: 30.6 PG (ref 27–31)
MCHC RBC AUTO-ENTMCNC: 34.5 G/DL (ref 33–36)
MCV RBC AUTO: 88.7 FL (ref 80–94)
MONOCYTES # BLD AUTO: 0.52 X10(3)/MCL (ref 0.1–1.3)
MONOCYTES NFR BLD AUTO: 7.5 %
NEUTROPHILS # BLD AUTO: 4.41 X10(3)/MCL (ref 2.1–9.2)
NEUTROPHILS NFR BLD AUTO: 63.8 %
PLATELET # BLD AUTO: 282 X10(3)/MCL (ref 130–400)
PMV BLD AUTO: 9.2 FL (ref 7.4–10.4)
POTASSIUM SERPL-SCNC: 4.5 MMOL/L (ref 3.5–5.1)
PROT SERPL-MCNC: 7.1 GM/DL (ref 6.4–8.3)
RBC # BLD AUTO: 4.87 X10(6)/MCL (ref 4.7–6.1)
SODIUM SERPL-SCNC: 139 MMOL/L (ref 136–145)
WBC # BLD AUTO: 6.92 X10(3)/MCL (ref 4.5–11.5)

## 2024-08-08 PROCEDURE — 36415 COLL VENOUS BLD VENIPUNCTURE: CPT

## 2024-08-08 PROCEDURE — 85652 RBC SED RATE AUTOMATED: CPT

## 2024-08-08 PROCEDURE — 83615 LACTATE (LD) (LDH) ENZYME: CPT

## 2024-08-08 PROCEDURE — 85025 COMPLETE CBC W/AUTO DIFF WBC: CPT

## 2024-08-08 PROCEDURE — 80053 COMPREHEN METABOLIC PANEL: CPT

## 2024-08-14 ENCOUNTER — OFFICE VISIT (OUTPATIENT)
Dept: HEMATOLOGY/ONCOLOGY | Facility: CLINIC | Age: 19
End: 2024-08-14
Payer: COMMERCIAL

## 2024-08-14 VITALS
BODY MASS INDEX: 25.94 KG/M2 | RESPIRATION RATE: 14 BRPM | OXYGEN SATURATION: 96 % | TEMPERATURE: 98 F | HEART RATE: 89 BPM | DIASTOLIC BLOOD PRESSURE: 76 MMHG | WEIGHT: 202.13 LBS | SYSTOLIC BLOOD PRESSURE: 136 MMHG | HEIGHT: 74 IN

## 2024-08-14 DIAGNOSIS — C81.18 NODULAR SCLEROSIS HODGKIN LYMPHOMA OF LYMPH NODES OF MULTIPLE REGIONS: Primary | ICD-10-CM

## 2024-08-14 PROCEDURE — 99213 OFFICE O/P EST LOW 20 MIN: CPT | Mod: S$GLB,,, | Performed by: NURSE PRACTITIONER

## 2024-08-14 PROCEDURE — 3075F SYST BP GE 130 - 139MM HG: CPT | Mod: CPTII,S$GLB,, | Performed by: NURSE PRACTITIONER

## 2024-08-14 PROCEDURE — 1159F MED LIST DOCD IN RCRD: CPT | Mod: CPTII,S$GLB,, | Performed by: NURSE PRACTITIONER

## 2024-08-14 PROCEDURE — 3078F DIAST BP <80 MM HG: CPT | Mod: CPTII,S$GLB,, | Performed by: NURSE PRACTITIONER

## 2024-08-14 PROCEDURE — 99999 PR PBB SHADOW E&M-EST. PATIENT-LVL III: CPT | Mod: PBBFAC,,, | Performed by: NURSE PRACTITIONER

## 2024-08-14 PROCEDURE — 3008F BODY MASS INDEX DOCD: CPT | Mod: CPTII,S$GLB,, | Performed by: NURSE PRACTITIONER

## 2024-08-14 PROCEDURE — 1160F RVW MEDS BY RX/DR IN RCRD: CPT | Mod: CPTII,S$GLB,, | Performed by: NURSE PRACTITIONER

## 2024-08-14 NOTE — PROGRESS NOTES
Subjective:       Patient ID: Valeriy Nelson is a 19 y.o. male.  Pediatrician: Dr. Amena Lowery     Hodgkin Lymphoma Stage IIA--Diagnosed 21  Biopsy/pathology: Left neck excisional biopsy cervical LN done 21--Nodular sclerosis, classical Hodgkin Lymphoma, areas of clustering of malignant cells are identified suggestive of syncytial variant, CD15, PAX-5 and CD30: Positive in malignant cells, EMA: Focally positive in malignant cells, CD45: Negative in malignant cells, CD20 and CD3: Highlight the mixed B- and T lymphocyte population.  Imagin. CT soft tissue neck w/ and w/o contrast done at [facility] 21--extensive bulky left cervical lymphadenopathy concerning for lymphoma, multiple nodes throughout the carotid space, posterior cervical triangle and left supraclavicular region from level of mandibular angle to clavicle with internal foci of hypoenhancement suggesting cystic change, necrosis or suppurative lympadenitis, representative left supraclavicular lymph node measures [id number], biopsy recommended.  2. PET/CT done at [facility] 10/25/21--markedly FDG-avid left jugular chain, posterior triangle, and supraclavicular lymphadenopathy in the neck and FDG-avid superior mediastinal and left axillary zone 3 lymphadenopathy, reference lymph node anterior to SVC on right measures 2.9X1.1cm, SUV 10.4, right paratracheal node measures 2.0X1.4cm with SUV 7.8, left paratracheal node measures 1.9X0.9cm and periaortic prevascular lymph node measures 1.4X0.6cm with SUV 4.5, Deauville score 5, no masses or adenopathy in AP, no bone lesions.  3. PET/CT done at [facility] 21--significant interval response to treatment, no residual FDG uptake w/n the nodes which are significantly reduced in size in neck and chest, representative left supraclavicular LN previously measured 1.6X1.4cm now measures 1X0.4cm, previous SUV 9.8, now there is no uptake, Deauville score 1, significant areas of  "uptake appearing bilaterally symmetric in neck, chest, upper abdomen w/o corresponding adenopathy, likely repeat to prominent brown fat, normal variant.  4. PET/CT done at [facility] 2/9/22--Stable exam without evidence of new or progressive hypermetabolic lymphadenopathy compared to 10/13/2021.  5. CT neck/C/A/P done 8/8/22--no enlarged LNs in neck, chest, abdomen or pelvis.   6. CT neck/C/A/P done 2/8/23--no enlarged Lns in neck, chest, abdomen or pelvis.   7. CT neck/C/A/P done 8/11/23--no enlarged Lns in neck, chest, abdomen or pelvis.  8. CT neck/C/A/P done 2/7/24--no enlarged Lns in neck, chest, abdomen or pelvis.      Right IJ mediport placed by Dr. Lopez 10/13/21--removed 2/10/23.     Treatment history:   ABVD X 4 cycles completed 10/26/21--2/4/22.     Current treatment plan: Observation     Chief Complaint: Other Misc (Pt reports no concerns today.)    HPI   Patient presents for follow-up of Hodgkin Lymphoma. He is doing well. 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 His recent labs were all good other than his creatinine which was elevated. Admits that he did take some "pre-workout" earlier that day before going to the gym. Recommended he drink more water. He denies any weight loss, fevers or night sweats. Mentions he has been noticing some hair thinning but other than that, no complaints.     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     No current facility-administered medications on file prior to visit.      Review of Systems   Constitutional:  Negative for appetite change, fatigue, fever and unexpected weight change.   HENT:  Negative for mouth sores.    Eyes: Negative.  Negative for visual disturbance.   Respiratory:  " Negative for cough and shortness of breath.    Cardiovascular:  Negative for chest pain and leg swelling.   Gastrointestinal:  Negative for abdominal distention, abdominal pain, constipation, diarrhea, nausea, vomiting and reflux.   Genitourinary:  Negative for difficulty urinating, dysuria, frequency and hematuria.   Musculoskeletal:  Negative for arthralgias and back pain.   Integumentary:  Negative for rash.   Neurological:  Negative for weakness and headaches.   Hematological:  Negative for adenopathy.   Psychiatric/Behavioral:  Negative for sleep disturbance. The patient is not nervous/anxious.          Vitals:    08/14/24 1337   BP: 136/76   Pulse: 89   Resp: 14   Temp: 98.1 °F (36.7 °C)     Physical Exam  Vitals reviewed.   Constitutional:       Appearance: Normal appearance. He is well-developed and normal weight.   HENT:      Head: Normocephalic.   Eyes:      General: Lids are normal. Vision grossly intact.      Extraocular Movements: Extraocular movements intact.   Cardiovascular:      Rate and Rhythm: Normal rate and regular rhythm.      Heart sounds: Normal heart sounds.   Pulmonary:      Effort: Pulmonary effort is normal.      Breath sounds: Normal breath sounds and air entry.   Abdominal:      General: Abdomen is flat. Bowel sounds are normal.      Palpations: Abdomen is soft.   Musculoskeletal:      Cervical back: Normal range of motion and neck supple.      Right lower leg: No edema.      Left lower leg: No edema.   Lymphadenopathy:      Comments: No palpable lymphadenopathy   Skin:     General: Skin is warm and moist.   Neurological:      General: No focal deficit present.      Mental Status: He is alert and oriented to person, place, and time.   Psychiatric:         Attention and Perception: Attention normal.         Mood and Affect: Mood normal.         Behavior: Behavior normal. Behavior is cooperative.         Thought Content: Thought content normal.         Judgment: Judgment normal.        ECOG SCORE    0 - Fully active-able to carry on all pre-disease performance without restriction        Lab Visit on 08/08/2024   Component Date Value    Sodium 08/08/2024 139     Potassium 08/08/2024 4.5     Chloride 08/08/2024 105     CO2 08/08/2024 27     Glucose 08/08/2024 83     Blood Urea Nitrogen 08/08/2024 13.5     Creatinine 08/08/2024 1.59 (H)     Calcium 08/08/2024 9.8     Protein Total 08/08/2024 7.1     Albumin 08/08/2024 4.6     Globulin 08/08/2024 2.5     Albumin/Globulin Ratio 08/08/2024 1.8     Bilirubin Total 08/08/2024 0.6     ALP 08/08/2024 86     ALT 08/08/2024 19     AST 08/08/2024 16     eGFR 08/08/2024 >60     Anion Gap 08/08/2024 7.0     BUN/Creatinine Ratio 08/08/2024 8     Lactate Dehydrogenase 08/08/2024 117 (L)     Sed Rate 08/08/2024 <1     WBC 08/08/2024 6.92     RBC 08/08/2024 4.87     Hgb 08/08/2024 14.9     Hct 08/08/2024 43.2     MCV 08/08/2024 88.7     MCH 08/08/2024 30.6     MCHC 08/08/2024 34.5     RDW 08/08/2024 12.1     Platelet 08/08/2024 282     MPV 08/08/2024 9.2     Neut % 08/08/2024 63.8     Lymph % 08/08/2024 25.6     Mono % 08/08/2024 7.5     Eos % 08/08/2024 2.3     Basophil % 08/08/2024 0.7     Lymph # 08/08/2024 1.77     Neut # 08/08/2024 4.41     Mono # 08/08/2024 0.52     Eos # 08/08/2024 0.16     Baso # 08/08/2024 0.05     IG# 08/08/2024 0.01     IG% 08/08/2024 0.1         Assessment:       1. 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  Patient has no B-symptoms. LDH normal, ESR 19 (<50). No anemia or leukocytosis.  PET done 10/25/21 showed LN involvement left neck, axilla and mediastinum.   Patient appears to have favorable risk disease though he does have involvement of 3 LN regions.   Treatment recommended per NCCN guidelines with ABVD. Recommended against radiation due to his young age.     2DECHO  done 10/22/21 EF 60-65%, full PFTs normal.  Cycle 1 ABVD started on 10/26/21.   PET/CT done 12/8/21 after 2 cycles showed CR with Deauville 1 and resolution of all adenopathy.  Completed Cycle 4 on 2/4/22.    Currently patient is doing well without any signs or symptoms to suggest disease recurrence.   Last CT soft tissue neck/C/A/P done 2/7/24 with RUBI.   Recent LDH low and sed rate WNL. Elevated creatinine noted which could of been from the pre-workout he took before the gym. eGFR was normal. Encouraged him to stay hydrated on days he goes to the gym.   Will continue routine surveillance visits.  No further scans planned unless any concerning signs/symptoms or lab abnormalities.  Plan to continue with visits every 6 months until after 5 years then annually.  Follow-up in 6 months with labs and visit.     All questions answered at this time.       CHEKO Zhang

## 2025-02-18 PROBLEM — Z85.71 HISTORY OF HODGKIN LYMPHOMA: Status: ACTIVE | Noted: 2025-02-18

## 2025-02-18 NOTE — PROGRESS NOTES
Subjective:       Patient ID: Valeriy Nelson is a 19 y.o. male.  Pediatrician: Dr. Amena Lowery     Hodgkin Lymphoma Stage IIA--Diagnosed 21  Biopsy/pathology: Left neck excisional biopsy cervical LN done 21--Nodular sclerosis, classical Hodgkin Lymphoma, areas of clustering of malignant cells are identified suggestive of syncytial variant, CD15, PAX-5 and CD30: Positive in malignant cells, EMA: Focally positive in malignant cells, CD45: Negative in malignant cells, CD20 and CD3: Highlight the mixed B- and T lymphocyte population.  Imagin. CT soft tissue neck w/ and w/o contrast done at [facility] 21--extensive bulky left cervical lymphadenopathy concerning for lymphoma, multiple nodes throughout the carotid space, posterior cervical triangle and left supraclavicular region from level of mandibular angle to clavicle with internal foci of hypoenhancement suggesting cystic change, necrosis or suppurative lympadenitis, representative left supraclavicular lymph node measures 2.5G9C2gi, biopsy recommended.  2. PET/CT done at [facility] 10/25/21--markedly FDG-avid left jugular chain, posterior triangle, and supraclavicular lymphadenopathy in the neck and FDG-avid superior mediastinal and left axillary zone 3 lymphadenopathy, reference lymph node anterior to SVC on right measures 2.9X1.1cm, SUV 10.4, right paratracheal node measures 2.0X1.4cm with SUV 7.8, left paratracheal node measures 1.9X0.9cm and periaortic prevascular lymph node measures 1.4X0.6cm with SUV 4.5, Deauville score 5, no masses or adenopathy in AP, no bone lesions.  3. PET/CT done at [facility] 21--significant interval response to treatment, no residual FDG uptake w/n the nodes which are significantly reduced in size in neck and chest, representative left supraclavicular LN previously measured 1.6X1.4cm now measures 1X0.4cm, previous SUV 9.8, now there is no uptake, Deauville score 1, significant areas of  uptake appearing bilaterally symmetric in neck, chest, upper abdomen w/o corresponding adenopathy, likely repeat to prominent brown fat, normal variant.  4. PET/CT done at [facility] 2/9/22--Stable exam without evidence of new or progressive hypermetabolic lymphadenopathy compared to 10/13/2021.  5. CT neck/C/A/P done 8/8/22--no enlarged LNs in neck, chest, abdomen or pelvis.   6. CT neck/C/A/P done 2/8/23--no enlarged Lns in neck, chest, abdomen or pelvis.   7. CT neck/C/A/P done 8/11/23--no enlarged Lns in neck, chest, abdomen or pelvis.  8. CT neck/C/A/P done 2/7/24--no enlarged Lns in neck, chest, abdomen or pelvis.      Right IJ mediport placed by Dr. Lopez 10/13/21--removed 2/10/23.     Treatment history:   ABVD X 4 cycles completed 10/26/21--2/4/22.     Current treatment plan: Observation     Chief Complaint: Other Misc (Pt reports no concerns today./)    HPI   Patient presents for follow-up of Hodgkin Lymphoma. He is doing well. 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 He is also engaged and will be getting  in July. His fiance is from [place] so he will be moving to [place] after the wedding! He is looking for a job. His recent labs were all good other than mild renal insufficiency. Recommended he drink more water. He denies any weight loss, fevers or night sweats. His LDH was elevated which was new but he is currently without any symptoms.     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     No current facility-administered medications on file prior to visit.      Review of Systems   Constitutional:  Negative for appetite change, fatigue, fever and unexpected weight change.   HENT:  Negative for mouth sores.    Eyes: Negative.   Negative for visual disturbance.   Respiratory:  Negative for cough and shortness of breath.    Cardiovascular:  Negative for chest pain and leg swelling.   Gastrointestinal:  Negative for abdominal distention, abdominal pain, constipation, diarrhea, nausea, vomiting and reflux.   Genitourinary:  Negative for difficulty urinating, dysuria, frequency and hematuria.   Musculoskeletal:  Negative for arthralgias and back pain.   Integumentary:  Negative for rash.   Neurological:  Negative for weakness and headaches.   Hematological:  Negative for adenopathy.   Psychiatric/Behavioral:  Negative for sleep disturbance. The patient is not nervous/anxious.          Vitals:    02/25/25 1508   BP: (!) 146/81   Pulse: 76   Resp: 14   Temp: 98 °F (36.7 °C)     Physical Exam  Vitals reviewed.   Constitutional:       Appearance: Normal appearance. He is well-developed and normal weight.   HENT:      Head: Normocephalic.   Eyes:      General: Lids are normal. Vision grossly intact.      Extraocular Movements: Extraocular movements intact.   Cardiovascular:      Rate and Rhythm: Normal rate and regular rhythm.      Heart sounds: Normal heart sounds.   Pulmonary:      Effort: Pulmonary effort is normal.      Breath sounds: Normal breath sounds and air entry.   Abdominal:      General: Abdomen is flat. Bowel sounds are normal.      Palpations: Abdomen is soft.   Musculoskeletal:      Cervical back: Normal range of motion and neck supple.      Right lower leg: No edema.      Left lower leg: No edema.   Lymphadenopathy:      Comments: No palpable lymphadenopathy   Skin:     General: Skin is warm and moist.   Neurological:      General: No focal deficit present.      Mental Status: He is alert and oriented to person, place, and time.   Psychiatric:         Attention and Perception: Attention normal.         Mood and Affect: Mood normal.         Behavior: Behavior normal. Behavior is cooperative.         Thought Content: Thought content  normal.         Judgment: Judgment normal.       ECOG SCORE    0 - Fully active-able to carry on all pre-disease performance without restriction        Lab Visit on 02/20/2025   Component Date Value    Sodium 02/20/2025 137     Potassium 02/20/2025 4.3     Chloride 02/20/2025 103     CO2 02/20/2025 28     Glucose 02/20/2025 95     Blood Urea Nitrogen 02/20/2025 23.8 (H)     Creatinine 02/20/2025 1.28 (H)     Calcium 02/20/2025 9.4     Protein Total 02/20/2025 7.1     Albumin 02/20/2025 4.3     Globulin 02/20/2025 2.8     Albumin/Globulin Ratio 02/20/2025 1.5     Bilirubin Total 02/20/2025 0.4     ALP 02/20/2025 83     ALT 02/20/2025 20     AST 02/20/2025 21     eGFR 02/20/2025 >60     Anion Gap 02/20/2025 6.0     BUN/Creatinine Ratio 02/20/2025 19     Lactate Dehydrogenase 02/20/2025 259 (H)     WBC 02/20/2025 6.60     RBC 02/20/2025 4.84     Hgb 02/20/2025 14.9     Hct 02/20/2025 42.3     MCV 02/20/2025 87.4     MCH 02/20/2025 30.8     MCHC 02/20/2025 35.2     RDW 02/20/2025 11.7     Platelet 02/20/2025 286     MPV 02/20/2025 9.2     Neut % 02/20/2025 53.1     Lymph % 02/20/2025 34.7     Mono % 02/20/2025 8.2     Eos % 02/20/2025 3.2     Basophil % 02/20/2025 0.5     Imm Grans % 02/20/2025 0.3     Neut # 02/20/2025 3.51     Lymph # 02/20/2025 2.29     Mono # 02/20/2025 0.54     Eos # 02/20/2025 0.21     Baso # 02/20/2025 0.03     Imm Gran # 02/20/2025 0.02         Assessment:       1. History of Hodgkin lymphoma        Plan:     Patient with Hodgkin Lymphoma diagnosed 9/24/21, Classical Nodular sclerosis type, questionable syncytial variant.  Patient has no B-symptoms. LDH normal, ESR 19 (<50). No anemia or leukocytosis.  PET done 10/25/21 showed LN involvement left neck, axilla and mediastinum.   Patient appears to have favorable risk disease though he does have involvement of 3 LN regions.   Treatment recommended per NCCN guidelines with ABVD. Recommended against radiation due to his young age.     2DECHO done  10/22/21 EF 60-65%, full PFTs normal.  Cycle 1 ABVD started on 10/26/21.   PET/CT done 12/8/21 after 2 cycles showed CR with Deauville 1 and resolution of all adenopathy.  Completed Cycle 4 on 2/4/22.    Currently patient is doing well without any signs or symptoms to suggest disease recurrence.   Last CT soft tissue neck/C/A/P done 2/7/24 with RUBI.   Recent labs with mild renal insufficiency which is not new as he takes pre-workout before going to the gym. eGFR was normal. Encouraged him to drink more water.   His LDH was mildly increased. Denies any B symptoms or recent infections.  Since he will be graduating, getting  and then moving to [place], we discussed a short-term follow-up in 3 months with repeat labs.   If all good, then plan to see him when he returns here to visit family. If LDH more elevated, then will need to obtain imaging prior to his move. Patient in agreement.  Follow-up in 3 months with repeat labs done prior.     All questions answered at this time.       CHEKO Zhang    Visit today included increased complexity associated with the care of the longitudinal care of the patient's history of HL.

## 2025-02-20 ENCOUNTER — LAB VISIT (OUTPATIENT)
Dept: LAB | Facility: HOSPITAL | Age: 20
End: 2025-02-20
Attending: NURSE PRACTITIONER
Payer: COMMERCIAL

## 2025-02-20 DIAGNOSIS — C81.18 NODULAR SCLEROSIS HODGKIN LYMPHOMA OF LYMPH NODES OF MULTIPLE REGIONS: ICD-10-CM

## 2025-02-20 LAB
ALBUMIN SERPL-MCNC: 4.3 G/DL (ref 3.5–5)
ALBUMIN/GLOB SERPL: 1.5 RATIO (ref 1.1–2)
ALP SERPL-CCNC: 83 UNIT/L (ref 40–150)
ALT SERPL-CCNC: 20 UNIT/L (ref 0–55)
ANION GAP SERPL CALC-SCNC: 6 MEQ/L
AST SERPL-CCNC: 21 UNIT/L (ref 5–34)
BASOPHILS # BLD AUTO: 0.03 X10(3)/MCL
BASOPHILS NFR BLD AUTO: 0.5 %
BILIRUB SERPL-MCNC: 0.4 MG/DL
BUN SERPL-MCNC: 23.8 MG/DL (ref 8.9–20.6)
CALCIUM SERPL-MCNC: 9.4 MG/DL (ref 8.4–10.2)
CHLORIDE SERPL-SCNC: 103 MMOL/L (ref 98–107)
CO2 SERPL-SCNC: 28 MMOL/L (ref 22–29)
CREAT SERPL-MCNC: 1.28 MG/DL (ref 0.72–1.25)
CREAT/UREA NIT SERPL: 19
EOSINOPHIL # BLD AUTO: 0.21 X10(3)/MCL (ref 0–0.9)
EOSINOPHIL NFR BLD AUTO: 3.2 %
ERYTHROCYTE [DISTWIDTH] IN BLOOD BY AUTOMATED COUNT: 11.7 % (ref 11.5–17)
GFR SERPLBLD CREATININE-BSD FMLA CKD-EPI: >60 ML/MIN/1.73/M2
GLOBULIN SER-MCNC: 2.8 GM/DL (ref 2.4–3.5)
GLUCOSE SERPL-MCNC: 95 MG/DL (ref 74–100)
HCT VFR BLD AUTO: 42.3 % (ref 42–52)
HGB BLD-MCNC: 14.9 G/DL (ref 14–18)
IMM GRANULOCYTES # BLD AUTO: 0.02 X10(3)/MCL (ref 0–0.04)
IMM GRANULOCYTES NFR BLD AUTO: 0.3 %
LDH SERPL-CCNC: 259 U/L (ref 125–220)
LYMPHOCYTES # BLD AUTO: 2.29 X10(3)/MCL (ref 0.6–4.6)
LYMPHOCYTES NFR BLD AUTO: 34.7 %
MCH RBC QN AUTO: 30.8 PG (ref 27–31)
MCHC RBC AUTO-ENTMCNC: 35.2 G/DL (ref 33–36)
MCV RBC AUTO: 87.4 FL (ref 80–94)
MONOCYTES # BLD AUTO: 0.54 X10(3)/MCL (ref 0.1–1.3)
MONOCYTES NFR BLD AUTO: 8.2 %
NEUTROPHILS # BLD AUTO: 3.51 X10(3)/MCL (ref 2.1–9.2)
NEUTROPHILS NFR BLD AUTO: 53.1 %
PLATELET # BLD AUTO: 286 X10(3)/MCL (ref 130–400)
PMV BLD AUTO: 9.2 FL (ref 7.4–10.4)
POTASSIUM SERPL-SCNC: 4.3 MMOL/L (ref 3.5–5.1)
PROT SERPL-MCNC: 7.1 GM/DL (ref 6.4–8.3)
RBC # BLD AUTO: 4.84 X10(6)/MCL (ref 4.7–6.1)
SODIUM SERPL-SCNC: 137 MMOL/L (ref 136–145)
WBC # BLD AUTO: 6.6 X10(3)/MCL (ref 4.5–11.5)

## 2025-02-20 PROCEDURE — 80053 COMPREHEN METABOLIC PANEL: CPT

## 2025-02-20 PROCEDURE — 83615 LACTATE (LD) (LDH) ENZYME: CPT

## 2025-02-20 PROCEDURE — 36415 COLL VENOUS BLD VENIPUNCTURE: CPT

## 2025-02-20 PROCEDURE — 85025 COMPLETE CBC W/AUTO DIFF WBC: CPT

## 2025-02-25 ENCOUNTER — OFFICE VISIT (OUTPATIENT)
Dept: HEMATOLOGY/ONCOLOGY | Facility: CLINIC | Age: 20
End: 2025-02-25
Payer: COMMERCIAL

## 2025-02-25 VITALS
WEIGHT: 211.19 LBS | BODY MASS INDEX: 27.1 KG/M2 | RESPIRATION RATE: 14 BRPM | HEART RATE: 76 BPM | TEMPERATURE: 98 F | HEIGHT: 74 IN | SYSTOLIC BLOOD PRESSURE: 146 MMHG | DIASTOLIC BLOOD PRESSURE: 81 MMHG | OXYGEN SATURATION: 95 %

## 2025-02-25 DIAGNOSIS — Z85.71 HISTORY OF HODGKIN LYMPHOMA: Primary | ICD-10-CM

## 2025-02-25 PROCEDURE — 99999 PR PBB SHADOW E&M-EST. PATIENT-LVL III: CPT | Mod: PBBFAC,,, | Performed by: NURSE PRACTITIONER

## 2025-06-04 ENCOUNTER — LAB VISIT (OUTPATIENT)
Dept: LAB | Facility: HOSPITAL | Age: 20
End: 2025-06-04
Attending: NURSE PRACTITIONER
Payer: COMMERCIAL

## 2025-06-04 ENCOUNTER — OFFICE VISIT (OUTPATIENT)
Dept: HEMATOLOGY/ONCOLOGY | Facility: CLINIC | Age: 20
End: 2025-06-04
Payer: COMMERCIAL

## 2025-06-04 VITALS
DIASTOLIC BLOOD PRESSURE: 83 MMHG | SYSTOLIC BLOOD PRESSURE: 139 MMHG | BODY MASS INDEX: 25.71 KG/M2 | HEIGHT: 74 IN | RESPIRATION RATE: 14 BRPM | HEART RATE: 67 BPM | OXYGEN SATURATION: 98 % | TEMPERATURE: 98 F | WEIGHT: 200.31 LBS

## 2025-06-04 DIAGNOSIS — C81.18 NODULAR SCLEROSIS HODGKIN LYMPHOMA OF LYMPH NODES OF MULTIPLE REGIONS: Primary | ICD-10-CM

## 2025-06-04 DIAGNOSIS — Z85.71 HISTORY OF HODGKIN LYMPHOMA: ICD-10-CM

## 2025-06-04 LAB
ALBUMIN SERPL-MCNC: 4.3 G/DL (ref 3.5–5)
ALBUMIN/GLOB SERPL: 1.3 RATIO (ref 1.1–2)
ALP SERPL-CCNC: 86 UNIT/L (ref 40–150)
ALT SERPL-CCNC: 13 UNIT/L (ref 0–55)
ANION GAP SERPL CALC-SCNC: 5 MEQ/L
AST SERPL-CCNC: 15 UNIT/L (ref 11–45)
BASOPHILS # BLD AUTO: 0.03 X10(3)/MCL
BASOPHILS NFR BLD AUTO: 0.5 %
BILIRUB SERPL-MCNC: 0.4 MG/DL
BUN SERPL-MCNC: 17.4 MG/DL (ref 8.9–20.6)
CALCIUM SERPL-MCNC: 9.9 MG/DL (ref 8.4–10.2)
CHLORIDE SERPL-SCNC: 103 MMOL/L (ref 98–107)
CO2 SERPL-SCNC: 30 MMOL/L (ref 22–29)
CREAT SERPL-MCNC: 1.08 MG/DL (ref 0.72–1.25)
CREAT/UREA NIT SERPL: 16
EOSINOPHIL # BLD AUTO: 0.16 X10(3)/MCL (ref 0–0.9)
EOSINOPHIL NFR BLD AUTO: 2.9 %
ERYTHROCYTE [DISTWIDTH] IN BLOOD BY AUTOMATED COUNT: 11.5 % (ref 11.5–17)
ERYTHROCYTE [SEDIMENTATION RATE] IN BLOOD: 1 MM/HR (ref 0–15)
GFR SERPLBLD CREATININE-BSD FMLA CKD-EPI: >60 ML/MIN/1.73/M2
GLOBULIN SER-MCNC: 3.4 GM/DL (ref 2.4–3.5)
GLUCOSE SERPL-MCNC: 96 MG/DL (ref 74–100)
HCT VFR BLD AUTO: 43.6 % (ref 42–52)
HGB BLD-MCNC: 15 G/DL (ref 14–18)
IMM GRANULOCYTES # BLD AUTO: 0.02 X10(3)/MCL (ref 0–0.04)
IMM GRANULOCYTES NFR BLD AUTO: 0.4 %
LDH SERPL-CCNC: 201 U/L (ref 125–220)
LYMPHOCYTES # BLD AUTO: 1.84 X10(3)/MCL (ref 0.6–4.6)
LYMPHOCYTES NFR BLD AUTO: 33.2 %
MCH RBC QN AUTO: 30.5 PG (ref 27–31)
MCHC RBC AUTO-ENTMCNC: 34.4 G/DL (ref 33–36)
MCV RBC AUTO: 88.6 FL (ref 80–94)
MONOCYTES # BLD AUTO: 0.41 X10(3)/MCL (ref 0.1–1.3)
MONOCYTES NFR BLD AUTO: 7.4 %
NEUTROPHILS # BLD AUTO: 3.08 X10(3)/MCL (ref 2.1–9.2)
NEUTROPHILS NFR BLD AUTO: 55.6 %
PLATELET # BLD AUTO: 303 X10(3)/MCL (ref 130–400)
PMV BLD AUTO: 8.8 FL (ref 7.4–10.4)
POTASSIUM SERPL-SCNC: 5.8 MMOL/L (ref 3.5–5.1)
PROT SERPL-MCNC: 7.7 GM/DL (ref 6.4–8.3)
RBC # BLD AUTO: 4.92 X10(6)/MCL (ref 4.7–6.1)
SODIUM SERPL-SCNC: 138 MMOL/L (ref 136–145)
WBC # BLD AUTO: 5.54 X10(3)/MCL (ref 4.5–11.5)

## 2025-06-04 PROCEDURE — 3075F SYST BP GE 130 - 139MM HG: CPT | Mod: CPTII,S$GLB,, | Performed by: NURSE PRACTITIONER

## 2025-06-04 PROCEDURE — 1159F MED LIST DOCD IN RCRD: CPT | Mod: CPTII,S$GLB,, | Performed by: NURSE PRACTITIONER

## 2025-06-04 PROCEDURE — 85652 RBC SED RATE AUTOMATED: CPT

## 2025-06-04 PROCEDURE — 80053 COMPREHEN METABOLIC PANEL: CPT

## 2025-06-04 PROCEDURE — G2211 COMPLEX E/M VISIT ADD ON: HCPCS | Mod: S$GLB,,, | Performed by: NURSE PRACTITIONER

## 2025-06-04 PROCEDURE — 3008F BODY MASS INDEX DOCD: CPT | Mod: CPTII,S$GLB,, | Performed by: NURSE PRACTITIONER

## 2025-06-04 PROCEDURE — 99999 PR PBB SHADOW E&M-EST. PATIENT-LVL III: CPT | Mod: PBBFAC,,, | Performed by: NURSE PRACTITIONER

## 2025-06-04 PROCEDURE — 85025 COMPLETE CBC W/AUTO DIFF WBC: CPT

## 2025-06-04 PROCEDURE — 99213 OFFICE O/P EST LOW 20 MIN: CPT | Mod: S$GLB,,, | Performed by: NURSE PRACTITIONER

## 2025-06-04 PROCEDURE — 1160F RVW MEDS BY RX/DR IN RCRD: CPT | Mod: CPTII,S$GLB,, | Performed by: NURSE PRACTITIONER

## 2025-06-04 PROCEDURE — 3079F DIAST BP 80-89 MM HG: CPT | Mod: CPTII,S$GLB,, | Performed by: NURSE PRACTITIONER

## 2025-06-04 PROCEDURE — 36415 COLL VENOUS BLD VENIPUNCTURE: CPT

## 2025-06-04 PROCEDURE — 83615 LACTATE (LD) (LDH) ENZYME: CPT
